# Patient Record
Sex: MALE | Race: WHITE
[De-identification: names, ages, dates, MRNs, and addresses within clinical notes are randomized per-mention and may not be internally consistent; named-entity substitution may affect disease eponyms.]

---

## 2019-11-18 ENCOUNTER — HOSPITAL ENCOUNTER (OUTPATIENT)
Dept: HOSPITAL 26 - MUS | Age: 59
Discharge: HOME | End: 2019-11-18
Payer: COMMERCIAL

## 2019-11-18 DIAGNOSIS — E04.2: Primary | ICD-10-CM

## 2019-12-30 ENCOUNTER — HOSPITAL ENCOUNTER (INPATIENT)
Dept: HOSPITAL 26 - MED | Age: 59
LOS: 7 days | Discharge: HOME HEALTH | DRG: 673 | End: 2020-01-06
Attending: GENERAL PRACTICE | Admitting: GENERAL PRACTICE
Payer: COMMERCIAL

## 2019-12-30 VITALS — DIASTOLIC BLOOD PRESSURE: 91 MMHG | SYSTOLIC BLOOD PRESSURE: 142 MMHG

## 2019-12-30 VITALS — BODY MASS INDEX: 24.96 KG/M2 | WEIGHT: 159 LBS | HEIGHT: 67 IN

## 2019-12-30 DIAGNOSIS — D62: ICD-10-CM

## 2019-12-30 DIAGNOSIS — E43: ICD-10-CM

## 2019-12-30 DIAGNOSIS — A08.4: ICD-10-CM
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
## 2019-12-30 DIAGNOSIS — R60.1: ICD-10-CM

## 2019-12-30 DIAGNOSIS — I21.A1: ICD-10-CM

## 2019-12-30 DIAGNOSIS — E87.6: ICD-10-CM

## 2019-12-30 DIAGNOSIS — D63.1: ICD-10-CM

## 2019-12-30 DIAGNOSIS — E83.41: ICD-10-CM
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
## 2019-12-30 DIAGNOSIS — E07.9: ICD-10-CM

## 2019-12-30 DIAGNOSIS — J96.90: ICD-10-CM

## 2019-12-30 DIAGNOSIS — R33.9: ICD-10-CM

## 2019-12-30 DIAGNOSIS — S10.93XA: ICD-10-CM

## 2019-12-30 DIAGNOSIS — E83.39: ICD-10-CM

## 2019-12-30 DIAGNOSIS — I34.0: ICD-10-CM

## 2019-12-30 DIAGNOSIS — I50.43: ICD-10-CM

## 2019-12-30 DIAGNOSIS — Z94.0: ICD-10-CM

## 2019-12-30 DIAGNOSIS — N18.4: ICD-10-CM
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
## 2019-12-30 DIAGNOSIS — N17.0: Primary | ICD-10-CM

## 2019-12-30 DIAGNOSIS — R13.10: ICD-10-CM

## 2019-12-30 DIAGNOSIS — E87.1: ICD-10-CM

## 2019-12-30 DIAGNOSIS — G93.41: ICD-10-CM

## 2019-12-30 DIAGNOSIS — I13.0: ICD-10-CM
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
## 2019-12-30 LAB
ALBUMIN FLD-MCNC: 2.6 G/DL (ref 3.4–5)
ANION GAP SERPL CALCULATED.3IONS-SCNC: 20.5 MMOL/L (ref 8–16)
AST SERPL-CCNC: 75 U/L (ref 15–37)
BASOPHILS # BLD AUTO: 0 K/UL (ref 0–0.22)
BASOPHILS NFR BLD AUTO: 0.1 % (ref 0–2)
BILIRUB SERPL-MCNC: 0.6 MG/DL (ref 0–1)
CHLORIDE SERPL-SCNC: 74 MMOL/L (ref 98–107)
CO2 SERPL-SCNC: 14.3 MMOL/L (ref 21–32)
EOSINOPHIL # BLD AUTO: 0 K/UL (ref 0–0.4)
EOSINOPHIL NFR BLD AUTO: 0 % (ref 0–4)
ERYTHROCYTE [DISTWIDTH] IN BLOOD BY AUTOMATED COUNT: 19.4 % (ref 11.6–13.7)
GFR SERPL CREATININE-BSD FRML MDRD: 14 ML/MIN (ref 90–?)
GLUCOSE SERPL-MCNC: 139 MG/DL (ref 74–106)
HCT VFR BLD AUTO: 21 % (ref 36–52)
HGB BLD-MCNC: 7.2 G/DL (ref 12–18)
LYMPHOCYTES # BLD AUTO: 0.3 K/UL (ref 2–11.5)
LYMPHOCYTES NFR BLD AUTO: 7.2 % (ref 20.5–51.1)
MCH RBC QN AUTO: 36 PG (ref 27–31)
MCHC RBC AUTO-ENTMCNC: 34 G/DL (ref 33–37)
MCV RBC AUTO: 106.7 FL (ref 80–94)
MONOCYTES # BLD AUTO: 0.3 K/UL (ref 0.8–1)
MONOCYTES NFR BLD AUTO: 5.7 % (ref 1.7–9.3)
NEUTROPHILS # BLD AUTO: 4.1 K/UL (ref 1.8–7.7)
NEUTROPHILS NFR BLD AUTO: 87 % (ref 42.2–75.2)
PLATELET # BLD AUTO: 339 K/UL (ref 140–450)
POTASSIUM SERPL-SCNC: 3.8 MMOL/L (ref 3.5–5.1)
PROTHROMBIN TIME: 10.4 SECS (ref 10.8–13.4)
RBC # BLD AUTO: 1.97 MIL/UL (ref 4.2–6.1)
WBC # BLD AUTO: 4.7 K/UL (ref 4.8–10.8)

## 2019-12-30 PROCEDURE — P9016 RBC LEUKOCYTES REDUCED: HCPCS

## 2019-12-30 NOTE — NUR
60 Y/O MALE BIB SON. PRESENTS TO ED, C/O GENERALIZED WEAKNESS X3 DAYS. PT 
STATES HAVING MILD DIFFICULTY BREATHING, DENIES ANY CHEST PAIN. PT SPO2 AT 97% 
ROOM AIR. NO SOB NOTED. NO SLURRED SPEECH, FACIAL DROOP, ARM DRIFT. PT AAOX4. 
PT DENIES ANY HEADACHE/DIZZINESS. PT STABLE CONDITION. BED LOWEST POSITION, 
SIDE RAILS UP. WILL CONTINUE TO MONITOR.

## 2019-12-31 VITALS — SYSTOLIC BLOOD PRESSURE: 130 MMHG | DIASTOLIC BLOOD PRESSURE: 87 MMHG

## 2019-12-31 VITALS — SYSTOLIC BLOOD PRESSURE: 143 MMHG | DIASTOLIC BLOOD PRESSURE: 84 MMHG

## 2019-12-31 VITALS — DIASTOLIC BLOOD PRESSURE: 76 MMHG | SYSTOLIC BLOOD PRESSURE: 128 MMHG

## 2019-12-31 VITALS — DIASTOLIC BLOOD PRESSURE: 65 MMHG | SYSTOLIC BLOOD PRESSURE: 119 MMHG

## 2019-12-31 VITALS — SYSTOLIC BLOOD PRESSURE: 138 MMHG | DIASTOLIC BLOOD PRESSURE: 76 MMHG

## 2019-12-31 VITALS — DIASTOLIC BLOOD PRESSURE: 94 MMHG | SYSTOLIC BLOOD PRESSURE: 145 MMHG

## 2019-12-31 VITALS — SYSTOLIC BLOOD PRESSURE: 131 MMHG | DIASTOLIC BLOOD PRESSURE: 85 MMHG

## 2019-12-31 VITALS — SYSTOLIC BLOOD PRESSURE: 135 MMHG | DIASTOLIC BLOOD PRESSURE: 94 MMHG

## 2019-12-31 VITALS — DIASTOLIC BLOOD PRESSURE: 86 MMHG | SYSTOLIC BLOOD PRESSURE: 135 MMHG

## 2019-12-31 VITALS — SYSTOLIC BLOOD PRESSURE: 156 MMHG | DIASTOLIC BLOOD PRESSURE: 94 MMHG

## 2019-12-31 VITALS — DIASTOLIC BLOOD PRESSURE: 75 MMHG | SYSTOLIC BLOOD PRESSURE: 139 MMHG

## 2019-12-31 LAB
AMORPH SED URNS QL MICRO: (no result) /HPF
ANION GAP SERPL CALCULATED.3IONS-SCNC: 16.8 MMOL/L (ref 8–16)
ANION GAP SERPL CALCULATED.3IONS-SCNC: 20 MMOL/L (ref 8–16)
ANION GAP SERPL CALCULATED.3IONS-SCNC: 20.3 MMOL/L (ref 8–16)
ANION GAP SERPL CALCULATED.3IONS-SCNC: 23.3 MMOL/L (ref 8–16)
ANION GAP SERPL CALCULATED.3IONS-SCNC: 24.6 MMOL/L (ref 8–16)
APPEARANCE UR: CLEAR
BARBITURATES UR QL SCN: (no result) NG/ML
BENZODIAZ UR QL SCN: (no result) NG/ML
BILIRUB UR QL STRIP: NEGATIVE
BUN SERPL-MCNC: 49 MG/DL (ref 7–18)
BUN SERPL-MCNC: 76 MG/DL (ref 7–18)
BUN SERPL-MCNC: 76 MG/DL (ref 7–18)
BUN SERPL-MCNC: 77 MG/DL (ref 7–18)
BUN SERPL-MCNC: 78 MG/DL (ref 7–18)
BUN SERPL-MCNC: 81 MG/DL (ref 7–18)
BZE UR QL SCN: (no result) NG/ML
CANNABINOIDS UR QL SCN: (no result) NG/ML
CHLORIDE SERPL-SCNC: 77 MMOL/L (ref 98–107)
CHLORIDE SERPL-SCNC: 77 MMOL/L (ref 98–107)
CHLORIDE SERPL-SCNC: 78 MMOL/L (ref 98–107)
CHLORIDE SERPL-SCNC: 80 MMOL/L (ref 98–107)
CHLORIDE SERPL-SCNC: 88 MMOL/L (ref 98–107)
CHOLEST/HDLC SERPL: 5.5 {RATIO} (ref 1–4.5)
CK MB SERPL-MCNC: 12.2 NG/ML (ref 0–3.6)
CK MB SERPL-RTO: 1.3 % (ref 0–2.5)
CO2 SERPL-SCNC: 12.1 MMOL/L (ref 21–32)
CO2 SERPL-SCNC: 12.3 MMOL/L (ref 21–32)
CO2 SERPL-SCNC: 13.6 MMOL/L (ref 21–32)
CO2 SERPL-SCNC: 16.6 MMOL/L (ref 21–32)
CO2 SERPL-SCNC: 23.3 MMOL/L (ref 21–32)
COLOR UR: YELLOW
CREAT SERPL-MCNC: 3.6 MG/DL (ref 0.7–1.3)
CREAT SERPL-MCNC: 5.1 MG/DL (ref 0.7–1.3)
CREAT SERPL-MCNC: 5.2 MG/DL (ref 0.7–1.3)
CREAT SERPL-MCNC: 5.2 MG/DL (ref 0.7–1.3)
CREAT SERPL-MCNC: 5.3 MG/DL (ref 0.7–1.3)
CREAT SERPL-MCNC: 5.3 MG/DL (ref 0.7–1.3)
EOSINOPHIL NFR BLD MANUAL: 1 % (ref 0–4)
ERYTHROCYTE [DISTWIDTH] IN BLOOD BY AUTOMATED COUNT: 18.7 % (ref 11.6–13.7)
GFR SERPL CREATININE-BSD FRML MDRD: 14 ML/MIN (ref 90–?)
GFR SERPL CREATININE-BSD FRML MDRD: 15 ML/MIN (ref 90–?)
GFR SERPL CREATININE-BSD FRML MDRD: 22 ML/MIN (ref 90–?)
GLUCOSE SERPL-MCNC: 110 MG/DL (ref 74–106)
GLUCOSE SERPL-MCNC: 119 MG/DL (ref 74–106)
GLUCOSE SERPL-MCNC: 129 MG/DL (ref 74–106)
GLUCOSE SERPL-MCNC: 88 MG/DL (ref 74–106)
GLUCOSE SERPL-MCNC: 89 MG/DL (ref 74–106)
GLUCOSE UR STRIP-MCNC: (no result) MG/DL
HCT VFR BLD AUTO: 19.3 % (ref 36–52)
HDLC SERPL-MCNC: 25 MG/DL (ref 40–60)
HGB BLD-MCNC: 6.7 G/DL (ref 12–18)
HGB UR QL STRIP: (no result)
HYALINE CASTS URNS QL MICRO: (no result) /LPF
IRON SERPL-MCNC: 83 UG/DL (ref 50–175)
LDLC SERPL CALC-MCNC: 100 MG/DL (ref 60–100)
LEUKOCYTE ESTERASE UR QL STRIP: NEGATIVE
LIPASE SERPL-CCNC: 122 U/L (ref 73–393)
LYMPHOCYTES NFR BLD MANUAL: 10 % (ref 20–46)
MAGNESIUM SERPL-MCNC: 2.7 MG/DL (ref 1.8–2.4)
MAGNESIUM SERPL-MCNC: 2.8 MG/DL (ref 1.8–2.4)
MCH RBC QN AUTO: 37 PG (ref 27–31)
MCHC RBC AUTO-ENTMCNC: 35 G/DL (ref 33–37)
MCV RBC AUTO: 105.9 FL (ref 80–94)
MONOCYTES NFR BLD MANUAL: 6 % (ref 5–12)
NITRITE UR QL STRIP: NEGATIVE
NRBC BLD MANUAL-RTO: 7 /100WBC (ref 0–0)
OPIATES UR QL SCN: (no result) NG/ML
PCP UR QL SCN: (no result) NG/ML
PH UR STRIP: 6 [PH] (ref 5–9)
PHOSPHATE SERPL-MCNC: 5.7 MG/DL (ref 2.5–4.9)
PHOSPHATE SERPL-MCNC: 5.7 MG/DL (ref 2.5–4.9)
PLATELET # BLD AUTO: 338 K/UL (ref 140–450)
POTASSIUM SERPL-SCNC: 3.1 MMOL/L (ref 3.5–5.1)
POTASSIUM SERPL-SCNC: 3.6 MMOL/L (ref 3.5–5.1)
POTASSIUM SERPL-SCNC: 3.6 MMOL/L (ref 3.5–5.1)
POTASSIUM SERPL-SCNC: 3.7 MMOL/L (ref 3.5–5.1)
POTASSIUM SERPL-SCNC: 3.9 MMOL/L (ref 3.5–5.1)
RBC # BLD AUTO: 1.82 MIL/UL (ref 4.2–6.1)
RBC #/AREA URNS HPF: (no result) /HPF (ref 0–5)
SODIUM SERPL-SCNC: 105 MMOL/L (ref 136–145)
SODIUM SERPL-SCNC: 108 MMOL/L (ref 136–145)
SODIUM SERPL-SCNC: 109 MMOL/L (ref 136–145)
SODIUM SERPL-SCNC: 110 MMOL/L (ref 136–145)
SODIUM SERPL-SCNC: 113 MMOL/L (ref 136–145)
SODIUM SERPL-SCNC: 125 MMOL/L (ref 136–145)
TIBC SERPL-MCNC: 202 UG/DL (ref 250–450)
TRIGL SERPL-MCNC: 66 MG/DL (ref 30–150)
TSH SERPL DL<=0.05 MIU/L-ACNC: 2.27 UIU/ML (ref 0.34–3.74)
WBC # BLD AUTO: 5.4 K/UL (ref 4.8–10.8)
WBC NRBC COR # BLD AUTO: 5 K/UL (ref 4.5–11)
WBC,URINE: (no result) /HPF (ref 0–5)

## 2019-12-31 PROCEDURE — 5A1D70Z PERFORMANCE OF URINARY FILTRATION, INTERMITTENT, LESS THAN 6 HOURS PER DAY: ICD-10-PCS

## 2019-12-31 PROCEDURE — 02HV33Z INSERTION OF INFUSION DEVICE INTO SUPERIOR VENA CAVA, PERCUTANEOUS APPROACH: ICD-10-PCS | Performed by: GENERAL PRACTICE

## 2019-12-31 PROCEDURE — 30233N1 TRANSFUSION OF NONAUTOLOGOUS RED BLOOD CELLS INTO PERIPHERAL VEIN, PERCUTANEOUS APPROACH: ICD-10-PCS

## 2019-12-31 RX ADMIN — ATORVASTATIN CALCIUM SCH MG: 20 TABLET, FILM COATED ORAL at 21:00

## 2019-12-31 RX ADMIN — ACETAMINOPHEN SCH MG: 325 TABLET ORAL at 16:00

## 2019-12-31 RX ADMIN — OXYCODONE HYDROCHLORIDE AND ACETAMINOPHEN SCH MG: 500 TABLET ORAL at 09:19

## 2019-12-31 RX ADMIN — FUROSEMIDE SCH MG: 10 INJECTION, SOLUTION INTRAMUSCULAR; INTRAVENOUS at 09:48

## 2019-12-31 RX ADMIN — FUROSEMIDE SCH MG: 20 TABLET ORAL at 13:35

## 2019-12-31 RX ADMIN — ACETAMINOPHEN SCH MG: 325 TABLET ORAL at 20:00

## 2019-12-31 RX ADMIN — Medication SCH MG: at 09:19

## 2019-12-31 RX ADMIN — CYCLOSPORINE SCH MG: 25 CAPSULE, LIQUID FILLED ORAL at 09:22

## 2019-12-31 RX ADMIN — ACETAMINOPHEN SCH MG: 325 TABLET ORAL at 13:35

## 2019-12-31 RX ADMIN — FUROSEMIDE SCH MG: 20 TABLET ORAL at 16:52

## 2019-12-31 RX ADMIN — CALCIUM ACETATE SCH MG: 667 CAPSULE ORAL at 16:52

## 2019-12-31 RX ADMIN — MORPHINE SULFATE PRN MG: 2 INJECTION, SOLUTION INTRAMUSCULAR; INTRAVENOUS at 10:08

## 2019-12-31 RX ADMIN — CALCIUM ACETATE SCH MG: 667 CAPSULE ORAL at 13:34

## 2019-12-31 NOTE — NUR
INFORMED PT THAT WILL COME BACK FOR MEDICATION. PT UNCOOPERATIVE RIGHT NOW AND WANTING TO 
GET OUT OF BED. PT STATES HE WANTS TO GO OUT AND HE HAS FRIENDS WAITING FOR HIM. PT DID NOT 
WANT TO TAKE HIS MEDICATION. WILL NOT TAKE MEDICATION OUT OF OMNICELL.

## 2019-12-31 NOTE — NUR
PT ADMITTED TO ICU-2. TRANSFERRED VIA GURNEY. REPORT GIVEN TO YAMILE REID. PT 
STABLE CONDITION. TRANSFER OF CARE AT THIS TIME.

## 2019-12-31 NOTE — NUR
* ST PVE NOTE * 



Clinician attempting to complete Bedside dysphagia and oral mechanism exams, but MDs at 
bedside and time out called for central line insertion. Clinician attempting to return to pt 
2 more times, but central line insertion still incomplete at this time. 



Nsg informed clinician will return on Thursday, 1/2/20 to re-attempt evaluation if pt's 
condition allows, w/nsg verbalizing understanding and agreement w/clinician's report. 



PVE 

11:15 - 11:30

## 2019-12-31 NOTE — NUR
UNABLE TO INSERT ABRAHAM CATHETER. RESISTANCE FELT, NO BLEEDING NOTED. PT ABLE TO VOID FREELY 
USING URINAL. RESIDENT PHYSICIAN DR. SILVA MADE AWARE.

## 2019-12-31 NOTE — NUR
RECEIVED REPORT FROM CHARGE NURSE WHO RECEIVED PATIENT FROM ER. SON AND DAUGHTER IN LAW AT 
BEDSIDE. PATIENT SPEAKS MAINLY Citizen of [place] BUT ABLE TO COMMUNICATE AND UNDERSTAND STAFF WELL. 
PATIENT HAS BEANIE ON HEAD. JACKET AROUND FRONT OF BODY. AFEBRILE. ROOM AIR. NO SOB NOTED. 
PERRLA BILATERALLY. CLEAR LUNG SOUNDS. RESPIRATIONS UNLABORED AND SYMMETRICAL. BOWEL SOUNDS 
ACTIVE IN ALL 4 QUAD. DENIES ANY PAIN. STATES WAS ON LOW SODIUM DIET AT HOME "WHICH LED TO 
WHY WE ARE HERE NOW". SKIN INTACT. DENIES ANY OPEN WOUNDS. REFUSED VACCINES FLU OR PNA AT 
THIS TIME. AWARE OF WHAT THEY ARE AND REFUSED. AAOX4. ABLE TO MAKE NEEDS KNOWN. ABLE TO OBEY 
COMMANDS. MRSA SWABS COLLECTED BY CHARGE NURSE AND SENT TO LAB. VSS. STATES HX OF HTN. AV 
SHUNT STATED BY FAMILY AND NOTED ON RIGHT UPPER ARM. STATES RENAL TRANSPLANT OVER 20 YEARS 
AGO. HAVING HICCUPS AT THIS TIME INTERMITTENTLY. 18 G LEFT FA NOTED. INTACT AND PATENT. BED 
IN LOWEST POSITION. CALL LIGHT WITHIN REACH. ON CARDIAC MONITOR. PATIENT FALLING ASLEEP 
AFTER ADMISSION AT THIS TIME. SON STATES " HE WANTS TO TRY AND SLEEP" WILL CONTINUE TO 
MONITOR AT THIS TIME. NO S/S OF DISTRESS. NO SOB NOTED AT THIS TIME. NPO AT THIS TIME.

## 2019-12-31 NOTE — NUR
Note:



Per patient, he would like me to contact his son Benigno for assessment. I called Benigno 
multiple times, no answer, left messages.

## 2019-12-31 NOTE — NUR
PT STILL BEING UNCOOPERATIVE AND WANTING TO GET OUT OF BED. PT AGITATED STILL. MEDICATION 
ADMINISTERED. KEPT HOB 30 DEGREES. WILL CONTINUE TO MONITOR PT.

## 2019-12-31 NOTE — NUR
PATIENT USED URINAL. WITH NURSE ASSISTANCE. 300 CC OF CLEAR YELLOW URINE. PATIENT ABLE TO 
STAND AT SIDE OF BED WITH ASSISTANCE FOR SAFETY PURPOSES. NO SOB NOTED. NO S/S OF DISTRESS 
NOTED. ON ROOM AIR. VSS.

## 2019-12-31 NOTE — NUR
RESPIRATIONS EVEN AND UNLABORED. PUT PT ON OXYGEN DUE TO LOW OXYGEN SATURATION, WHEN CHECKED 
THE WAVEFORM WAS NOT CORRECT. CHECKED THE OXYGEN SATURATION WITH THE CORRECT WAVEFORM, 
READING WAS 96%. 

-------------------------------------------------------------------------------

Addendum: 01/01/20 at 0415 by Cheyenne Donahue RN

-------------------------------------------------------------------------------

WRONG DATE. DISREGARD. SUPPOSED TO BE ENTRY FOR 01/01/20

## 2019-12-31 NOTE — NUR
CENTRAL LINE INSERTION AT BEDSIDE. TIME OUT CALLED. NO SIGNS OF DISTRESS NOTED AT THIS TIME. 
SAFETY PRECAUTIONS IN PLACE.

## 2019-12-31 NOTE — NUR
PATIENT HAS BEEN SCREENED AND CATEGORIZED AS HIGH NUTRITION RISK. PATIENT WILL BE SEEN 
WITHIN 1-2 DAYS OF ADMISSION.



12/31/19-01/01/20



JODEE BENNETT RD

## 2019-12-31 NOTE — NUR
bedside use of urinal. patient able to stand. standby assistance for safety. 250cc yellow 
urine clear. no sob. no injury noted. will continue to monitor.

## 2019-12-31 NOTE — NUR
SPOKE WITH RADIOLOGY WHO STATES MD ABRAHAM JUST ORDERED CT SCAN FOR PATIENT. STATES SHE 
STILL HAS SOME PEOPLE ON THE FLOOR BUT WILL CALL WHEN DONE. MINDY CT NOT STAT. WILL FOLLOW 
UP.

## 2019-12-31 NOTE — NUR
PATIENT USED URINAL WITH ASSISTANCE OF SON. CLEAR YELLOW URINE 250 CC OUTPUT. NO INJURY 
NOTED. PATIENT TOLERATED WELL. WILL CONTINUE TO MONITOR.

## 2019-12-31 NOTE — NUR
MEDICATION ADMINISTERED TO PT TO HELP HIM SLEEP. TOLERATED WELL. ABLE TO SWALLOW MEDICATION 
WITH NO PROBLEM. FAMILY CURRENTLY AT BEDSIDE.

## 2019-12-31 NOTE — NUR
BLOOD TRANSFUSION STARTED. VSS. AFEBRILE. NO SIGNS OF ADVERSE REACTIONS NOTED. WILL CONTINUE 
TO MONITOR.

## 2019-12-31 NOTE — NUR
RECEIVED BEDSIDE REPORT FROM NIGHT SHIFT RN. PT IS AAOX4. ABLE TO MAKE NEEDS KNOWN. NORMAL 
SINUS RHYTHM ON MONITOR. PT IS ON ROOM AIR, LUNGS SOUND CLEAR BILATERALLY. ABDOMEN SOFT, 
NONTENDER WITH ACTIVE BOWEL SOUNDS. PERIPHERAL IV G 18 TO LEFT FOREARM PATENT AND INTACT, 
RUNNING NS AT 30 ML/HR. SKIN IS INTACT, DRY AND WARM TO TOUCH. PULSES PALPABLE TO ALL 
EXTREMITIES. CAP REFILL < 2 SEC. OLE AV SHUNT IN PLACE TO RIGHT ARM. ABLE TO MOVE ALL 
EXTREMITIES. BED IN LOWEST POSITION LOCKED. CALL LIGHT WITHIN REACH. WILL CONTINUE TO 
MONITOR.

## 2019-12-31 NOTE — NUR
RECEIVED REPORT FROM MORNING RN, KHADIJAH, FOR CONTINUITY OF CARE. VS STABLE AT THIS TIME. PT 
AROUSABLE TO NAME. DROWSY. AFEBRILE. ABLE TO MAKE NEEDS KNOWN AND FOLLOW COMMANDS. LUNG 
SOUNDS CLEAR. PT IN ROOM AIR. AUSCULTATED AND CLEAR LUNG SOUNDS. RESPIRATIONS EVEN AND 
UNLABORED. CHEST RISE SYMMETRIC. S1+S2 HEARD. SR ON MONITOR. ABDOMEN ROUND, SOFT AND 
NONDISTENDED. ON RENAL DIET. PT HAS BI CATHETER ON LEFT IJ, CURRENTLY RECEIVING 
DIALYSIS AT THIS TIME. PT ABLE TO USE URINAL. PT HAS PERIPHERAL IV ACCESS ON LEFT FOREARM 
18G. PT HAS 3% SODIUM CHLORIDE RUNNING AT 30ML/HR. ALL SAFETY PRECAUTIONS ARE IN PLACE. WILL 
CONTINUE TO MONITOR PT

## 2019-12-31 NOTE — NUR
NOTED PT TRYING TO GET OUT OF BED. PT'S FAMILY STILL AT BEDSIDE. OFFERED TO SIT PT TO A 
CHAIR. PT IS UNSTEADY AND DROWSY, UNABLE TO GET UP AND GET TO CHAIR WITH ASSISTANCE. PUT PT 
BACK TO BED AND COVERED HIM UP.

## 2019-12-31 NOTE — NUR
CHARGE NURSE STATES TO ENDORSE TO DAY SHIFT TO DO CT SCAN IN AM. CT SCAN IN USE AT THIS 
TIME. MD AT BEDSIDE STATES IT IS ROUTINE AND CAN ENDORSE FOR LATER. WILL CONTINUE TO MONITOR 
AT THIS TIME.

## 2019-12-31 NOTE — NUR
BLOOD TRANSFUSION DONE. VSS. NO SIGNS OF DISTRESS OR ADVERSE REACTION NOTED. NO NEED TO GIVE 
SECOND DOSE OF TYLENOL AND BENADRYL PER DR. RUSH.

## 2019-12-31 NOTE — NUR
12/31/19 RD INITIAL ASSESSMENT COMPLETED



PLEASE REFER TO NUTRITION ASSESSMENT UNDER CARE ACTIVITY FOR ESTIMATED NUTRITIONAL NEEDS. 



1. CONTINUE NPO AS MEDICALLY NECESSARY

2. RECOMMEND SWALLOW EVALUATION TO DETERMINE DIET TEXTURE AND LIQUID CONSISTENCY WITH RENAL 
DIETARY RESTRICTIONS

3. IF PATIENT IS UNABLE TO FOLLOW PO DIET, CONSIDER A FNS CONSULT FOR TUBE FEEDING

4. RD TO FOLLOW-UP 2-3 DAYS, HIGH RISK 



JODEE BENNETT, CARLOS

## 2019-12-31 NOTE — NUR
change of shift report given to day shift nurse. endorsed to day shift per hao crawford for day 
shift to take to ct. ct busy and busy with other patients. also endorsed the criticals to 
day shift madhav . stated called residents x3 and no answer. patient showing no sob or s/s 
of distress. vss. aaox4. no acitve bleeding noted. denies pain. steady denies any symptoms. 
endorsed to day shift.

## 2020-01-01 VITALS — DIASTOLIC BLOOD PRESSURE: 82 MMHG | SYSTOLIC BLOOD PRESSURE: 138 MMHG

## 2020-01-01 VITALS — SYSTOLIC BLOOD PRESSURE: 133 MMHG | DIASTOLIC BLOOD PRESSURE: 69 MMHG

## 2020-01-01 VITALS — DIASTOLIC BLOOD PRESSURE: 77 MMHG | SYSTOLIC BLOOD PRESSURE: 133 MMHG

## 2020-01-01 VITALS — DIASTOLIC BLOOD PRESSURE: 83 MMHG | SYSTOLIC BLOOD PRESSURE: 128 MMHG

## 2020-01-01 VITALS — DIASTOLIC BLOOD PRESSURE: 72 MMHG | SYSTOLIC BLOOD PRESSURE: 151 MMHG

## 2020-01-01 VITALS — SYSTOLIC BLOOD PRESSURE: 133 MMHG | DIASTOLIC BLOOD PRESSURE: 75 MMHG

## 2020-01-01 VITALS — SYSTOLIC BLOOD PRESSURE: 139 MMHG | DIASTOLIC BLOOD PRESSURE: 72 MMHG

## 2020-01-01 VITALS — DIASTOLIC BLOOD PRESSURE: 76 MMHG | SYSTOLIC BLOOD PRESSURE: 134 MMHG

## 2020-01-01 VITALS — DIASTOLIC BLOOD PRESSURE: 76 MMHG | SYSTOLIC BLOOD PRESSURE: 139 MMHG

## 2020-01-01 VITALS — DIASTOLIC BLOOD PRESSURE: 89 MMHG | SYSTOLIC BLOOD PRESSURE: 157 MMHG

## 2020-01-01 VITALS — DIASTOLIC BLOOD PRESSURE: 74 MMHG | SYSTOLIC BLOOD PRESSURE: 139 MMHG

## 2020-01-01 VITALS — DIASTOLIC BLOOD PRESSURE: 91 MMHG | SYSTOLIC BLOOD PRESSURE: 142 MMHG

## 2020-01-01 LAB
ANION GAP SERPL CALCULATED.3IONS-SCNC: 14.1 MMOL/L (ref 8–16)
ANION GAP SERPL CALCULATED.3IONS-SCNC: 14.6 MMOL/L (ref 8–16)
ANION GAP SERPL CALCULATED.3IONS-SCNC: 16.7 MMOL/L (ref 8–16)
ANION GAP SERPL CALCULATED.3IONS-SCNC: 18.3 MMOL/L (ref 8–16)
BASOPHILS # BLD AUTO: 0 K/UL (ref 0–0.22)
BASOPHILS NFR BLD AUTO: 0.1 % (ref 0–2)
BUN SERPL-MCNC: 48 MG/DL (ref 7–18)
BUN SERPL-MCNC: 49 MG/DL (ref 7–18)
BUN SERPL-MCNC: 50 MG/DL (ref 7–18)
BUN SERPL-MCNC: 53 MG/DL (ref 7–18)
CHLORIDE SERPL-SCNC: 89 MMOL/L (ref 98–107)
CHLORIDE SERPL-SCNC: 91 MMOL/L (ref 98–107)
CHLORIDE SERPL-SCNC: 92 MMOL/L (ref 98–107)
CHLORIDE SERPL-SCNC: 94 MMOL/L (ref 98–107)
CO2 SERPL-SCNC: 22.5 MMOL/L (ref 21–32)
CO2 SERPL-SCNC: 24.3 MMOL/L (ref 21–32)
CO2 SERPL-SCNC: 25.3 MMOL/L (ref 21–32)
CO2 SERPL-SCNC: 25.3 MMOL/L (ref 21–32)
CREAT SERPL-MCNC: 3.7 MG/DL (ref 0.7–1.3)
CREAT SERPL-MCNC: 3.8 MG/DL (ref 0.7–1.3)
CREAT SERPL-MCNC: 3.9 MG/DL (ref 0.7–1.3)
CREAT SERPL-MCNC: 4.1 MG/DL (ref 0.7–1.3)
EOSINOPHIL # BLD AUTO: 0 K/UL (ref 0–0.4)
EOSINOPHIL NFR BLD AUTO: 0 % (ref 0–4)
ERYTHROCYTE [DISTWIDTH] IN BLOOD BY AUTOMATED COUNT: 21.6 % (ref 11.6–13.7)
FERRITIN SERPL-MCNC: 527 NG/ML (ref 30–400)
FOLATE SERPL-MCNC: > 20 NG/ML (ref 3–?)
GFR SERPL CREATININE-BSD FRML MDRD: 19 ML/MIN (ref 90–?)
GFR SERPL CREATININE-BSD FRML MDRD: 20 ML/MIN (ref 90–?)
GFR SERPL CREATININE-BSD FRML MDRD: 21 ML/MIN (ref 90–?)
GFR SERPL CREATININE-BSD FRML MDRD: 22 ML/MIN (ref 90–?)
GLUCOSE SERPL-MCNC: 81 MG/DL (ref 74–106)
GLUCOSE SERPL-MCNC: 84 MG/DL (ref 74–106)
GLUCOSE SERPL-MCNC: 86 MG/DL (ref 74–106)
GLUCOSE SERPL-MCNC: 87 MG/DL (ref 74–106)
HCT VFR BLD AUTO: 19 % (ref 36–52)
HGB BLD-MCNC: 6.6 G/DL (ref 12–18)
LYMPHOCYTES # BLD AUTO: 0.2 K/UL (ref 2–11.5)
LYMPHOCYTES NFR BLD AUTO: 3.4 % (ref 20.5–51.1)
MAGNESIUM SERPL-MCNC: 2.2 MG/DL (ref 1.8–2.4)
MCH RBC QN AUTO: 36 PG (ref 27–31)
MCHC RBC AUTO-ENTMCNC: 35 G/DL (ref 33–37)
MCV RBC AUTO: 103.5 FL (ref 80–94)
MONOCYTES # BLD AUTO: 0.4 K/UL (ref 0.8–1)
MONOCYTES NFR BLD AUTO: 7.9 % (ref 1.7–9.3)
NEUTROPHILS # BLD AUTO: 4.8 K/UL (ref 1.8–7.7)
NEUTROPHILS NFR BLD AUTO: 88.6 % (ref 42.2–75.2)
PHOSPHATE SERPL-MCNC: 4.4 MG/DL (ref 2.5–4.9)
PLATELET # BLD AUTO: 272 K/UL (ref 140–450)
POTASSIUM SERPL-SCNC: 2.9 MMOL/L (ref 3.5–5.1)
POTASSIUM SERPL-SCNC: 3 MMOL/L (ref 3.5–5.1)
POTASSIUM SERPL-SCNC: 3.4 MMOL/L (ref 3.5–5.1)
POTASSIUM SERPL-SCNC: 3.8 MMOL/L (ref 3.5–5.1)
RBC # BLD AUTO: 1.83 MIL/UL (ref 4.2–6.1)
SODIUM SERPL-SCNC: 127 MMOL/L (ref 136–145)
SODIUM SERPL-SCNC: 128 MMOL/L (ref 136–145)
SODIUM SERPL-SCNC: 128 MMOL/L (ref 136–145)
SODIUM SERPL-SCNC: 131 MMOL/L (ref 136–145)
TRANSFERRIN SERPL-MCNC: 174 MG/DL (ref 200–370)
VIT B12 SERPL-MCNC: > 2000 PG/ML (ref 232–1245)
WBC # BLD AUTO: 5.4 K/UL (ref 4.8–10.8)

## 2020-01-01 RX ADMIN — ATORVASTATIN CALCIUM SCH MG: 20 TABLET, FILM COATED ORAL at 20:42

## 2020-01-01 RX ADMIN — Medication SCH MG: at 09:23

## 2020-01-01 RX ADMIN — SODIUM CHLORIDE SCH MLS/HR: 9 INJECTION, SOLUTION INTRAVENOUS at 06:05

## 2020-01-01 RX ADMIN — FUROSEMIDE SCH MG: 10 INJECTION, SOLUTION INTRAMUSCULAR; INTRAVENOUS at 09:23

## 2020-01-01 RX ADMIN — OXYCODONE HYDROCHLORIDE AND ACETAMINOPHEN SCH MG: 500 TABLET ORAL at 09:23

## 2020-01-01 RX ADMIN — CYCLOSPORINE SCH MG: 25 CAPSULE, LIQUID FILLED ORAL at 09:27

## 2020-01-01 NOTE — NUR
PT REPOSITIONED. MORNING CARE PROVIDED. EYES ARE CLOSED. SNORING. VS STABLE. OXYGEN 
SATURATION WNL.

## 2020-01-01 NOTE — NUR
PT STILL GETTING BLOOD TRANSFUSION. SON AT BEDSIDE, PT IS ASLEEP, ON ROOM AIR. NO SIGNS OF 
ACUTE DISTRESS NOTED. SAFETY PRECAUTIONS IN PLACE.

## 2020-01-01 NOTE — NUR
BLOOD TRANSFUSION STARTED. VSS. AFEBRILE. WILL MONITOR PT CLOSELY FOR POSSIBLE S/SX OF 
ADVERSE REACTIONS.

## 2020-01-01 NOTE — NUR
BLOOD TRANSFUSION COMPLETED. VSS. AFEBRILE. NO SIGNS OF ADVERSE REACTIONS NOTED. WILL 
CONTINUE TO MONITOR.

## 2020-01-01 NOTE — NUR
DR. ROGERS AT BEDSIDE TO SEE PT. CLARIFIED POTASSIUM ORDER, PER DOCTOR ROGERS PT WILL NEED ALL 
THE POTASSIUM ORDERED.

## 2020-01-01 NOTE — NUR
RESPIRATIONS EVEN AND UNLABORED. PUT PT ON OXYGEN DUE TO LOW OXYGEN SATURATION, WHEN CHECKED 
THE WAVEFORM WAS NOT CORRECT. CHECKED THE OXYGEN SATURATION WITH THE CORRECT WAVEFORM, 
READING WAS 96%.

## 2020-01-01 NOTE — NUR
DID BEDSIDE SWALLOW EVAL. PT SWALLOWS JELLO AND APPLE SAUCE WITHOUT COUGHING OR ANY 
DIFFICULTY. PT ABLE TO TAKE PILLS WITH APPLE SAUCE. CHARGE NURSE AWARE.

## 2020-01-01 NOTE — NUR
RECEIVED REPORT FROM AM NURSE. PT AT BED EYES CLOSED, PERRL 3MM, BRISK, COOPERATIVE, ALERT 
AND ORIENTED X4, BREATHING REGULARLY ON ROOM AIR, LUNG SOUNDS CLEAR THROUGHOUT, HEART RATE 
REGULAR S1S2 PRESENT, CAP REFILL <3S, PULSES 2+ BILATERAL UPPER AND LOWER EXTREMITIES, 
ABDOMEN SOFT, ROUND, NONDISTENDED, NONTENDER, BLADDER, SOFT, ROUND, NONDISTENDED, NONTENDER, 
ABRAHAM CATHETER IN PLACE, DRAINING CLEAR, YELLOW URINE, PT HAS GENERALIZED WEAKNESS, PT SKIN 
WARM, DRY, INTACT, PT HAS LEFT FOREARM PERIPHERAL IV 18 GAUGE RUNNING NS AT 30 ML/HR. PT HAS 
A LEFT IJ BI CATH. HOB 30 DEGREES, SIDE RAILS UP X2, BED AT LOWEST POSITION.

## 2020-01-01 NOTE — NUR
NO CHANGE IN PT'S CONDITION AT THIS TIME. RESPIRATIONS EVEN AND UNLABORED. OXYGEN SATURATION 
WNL. PT IN BED WITH EYES CLOSED. SNORING.

## 2020-01-01 NOTE — NUR
SEROSANGUINEOUS DRAINAGE NOTED FROM LEFT EAR. DR. CRONIN MADE AWARE. PT'S LEFT EAR EXAMINED 
BY DR. CRONIN AND DR. ROGERS AT BEDSIDE. WILL FOLLOW UP WITH ANY NEW ORDERS.

## 2020-01-01 NOTE — NUR
RECEIVED BEDSIDE REPORT FROM NIGHT SHIFT RN. PT IS AAOX1. ABLE TO MAKE NEEDS KNOWN BUT 
RESTLESS AND DOES NOT FOLLOW COMMANDS. DENIES PAIN. AFEBRILE. NORMAL SINUS RHYTHM ON 
MONITOR. S1 S2 HEARD. PULSES PALPABLE TO ALL EXTREMITIES. CAP REFILL < 2 SEC. PT IS ON ROOM 
AIR, LUNGS SOUND CLEAR BILATERALLY. BREATHING EVEN AND UNLABORED. ABDOMEN SOFT, NONTENDER 
WITH ACTIVE BOWEL SOUNDS. BI CATH TO LEFT IJ IN PLACE, PERIPHERAL IV G 18 TO LEFT 
FOREARM PATENT AND INTACT, RUNNING NS AT 30 ML/HR. SKIN IS INTACT, DRY AND WARM TO TOUCH. 
OLD AV SHUNT IN PLACE TO RIGHT ARM. ABLE TO MOVE ALL EXTREMITIES. HOB 30 DEGREES. BED IN 
LOWEST POSITION LOCKED. CALL LIGHT WITHIN REACH. WILL CONTINUE TO MONITOR.

## 2020-01-01 NOTE — NUR
DR. VILLARREAL IN THE UNIT. FOLLOWED UP REGARDING PT'S IV FLUIDS. ACCORDING TO HER, WAIT FOR 
MORNING LABS RESULT THEN SHE WILL ORDER IV FLUIDS.

## 2020-01-01 NOTE — NUR
NO CHANGE IN CONDITION AT THIS TIME. PT STILL DROWSY, DAUGHTER AT BEDSIDE, VSS. REPOSITIONED 
PT. SAFETY PRECAUTIONS IN PLACE.

## 2020-01-01 NOTE — NUR
VS STABLE. IV SITES INTACT AND ASYMPTOMATIC. HOB 30 DEGREES. ALL OTHER SAFETY PRECAUTIONS 
ARE IN PLACE.

## 2020-01-02 VITALS — DIASTOLIC BLOOD PRESSURE: 77 MMHG | SYSTOLIC BLOOD PRESSURE: 157 MMHG

## 2020-01-02 VITALS — SYSTOLIC BLOOD PRESSURE: 140 MMHG | DIASTOLIC BLOOD PRESSURE: 79 MMHG

## 2020-01-02 VITALS — SYSTOLIC BLOOD PRESSURE: 144 MMHG | DIASTOLIC BLOOD PRESSURE: 77 MMHG

## 2020-01-02 VITALS — SYSTOLIC BLOOD PRESSURE: 158 MMHG | DIASTOLIC BLOOD PRESSURE: 90 MMHG

## 2020-01-02 VITALS — DIASTOLIC BLOOD PRESSURE: 76 MMHG | SYSTOLIC BLOOD PRESSURE: 155 MMHG

## 2020-01-02 VITALS — SYSTOLIC BLOOD PRESSURE: 163 MMHG | DIASTOLIC BLOOD PRESSURE: 101 MMHG

## 2020-01-02 VITALS — SYSTOLIC BLOOD PRESSURE: 155 MMHG | DIASTOLIC BLOOD PRESSURE: 76 MMHG

## 2020-01-02 VITALS — DIASTOLIC BLOOD PRESSURE: 75 MMHG | SYSTOLIC BLOOD PRESSURE: 146 MMHG

## 2020-01-02 VITALS — DIASTOLIC BLOOD PRESSURE: 71 MMHG | SYSTOLIC BLOOD PRESSURE: 132 MMHG

## 2020-01-02 VITALS — SYSTOLIC BLOOD PRESSURE: 130 MMHG | DIASTOLIC BLOOD PRESSURE: 68 MMHG

## 2020-01-02 VITALS — DIASTOLIC BLOOD PRESSURE: 64 MMHG | SYSTOLIC BLOOD PRESSURE: 128 MMHG

## 2020-01-02 VITALS — DIASTOLIC BLOOD PRESSURE: 78 MMHG | SYSTOLIC BLOOD PRESSURE: 152 MMHG

## 2020-01-02 LAB
ANION GAP SERPL CALCULATED.3IONS-SCNC: 15.2 MMOL/L (ref 8–16)
BASOPHILS # BLD AUTO: 0 K/UL (ref 0–0.22)
BASOPHILS NFR BLD AUTO: 0.2 % (ref 0–2)
BUN SERPL-MCNC: 52 MG/DL (ref 7–18)
CHLORIDE SERPL-SCNC: 98 MMOL/L (ref 98–107)
CO2 SERPL-SCNC: 23.3 MMOL/L (ref 21–32)
CREAT SERPL-MCNC: 4.6 MG/DL (ref 0.7–1.3)
EOSINOPHIL # BLD AUTO: 0 K/UL (ref 0–0.4)
EOSINOPHIL NFR BLD AUTO: 0.9 % (ref 0–4)
ERYTHROCYTE [DISTWIDTH] IN BLOOD BY AUTOMATED COUNT: 21.8 % (ref 11.6–13.7)
GFR SERPL CREATININE-BSD FRML MDRD: 17 ML/MIN (ref 90–?)
GLUCOSE SERPL-MCNC: 75 MG/DL (ref 74–106)
HCT VFR BLD AUTO: 23.7 % (ref 36–52)
HGB BLD-MCNC: 8.1 G/DL (ref 12–18)
LYMPHOCYTES # BLD AUTO: 0.5 K/UL (ref 2–11.5)
LYMPHOCYTES NFR BLD AUTO: 9.1 % (ref 20.5–51.1)
MAGNESIUM SERPL-MCNC: 2.4 MG/DL (ref 1.8–2.4)
MCH RBC QN AUTO: 35 PG (ref 27–31)
MCHC RBC AUTO-ENTMCNC: 34 G/DL (ref 33–37)
MCV RBC AUTO: 101.6 FL (ref 80–94)
MONOCYTES # BLD AUTO: 0.4 K/UL (ref 0.8–1)
MONOCYTES NFR BLD AUTO: 8.5 % (ref 1.7–9.3)
NEUTROPHILS # BLD AUTO: 4.3 K/UL (ref 1.8–7.7)
NEUTROPHILS NFR BLD AUTO: 81.3 % (ref 42.2–75.2)
PHOSPHATE SERPL-MCNC: 4.3 MG/DL (ref 2.5–4.9)
PLATELET # BLD AUTO: 249 K/UL (ref 140–450)
POTASSIUM SERPL-SCNC: 3.5 MMOL/L (ref 3.5–5.1)
RBC # BLD AUTO: 2.34 MIL/UL (ref 4.2–6.1)
SODIUM SERPL-SCNC: 133 MMOL/L (ref 136–145)
WBC # BLD AUTO: 5.3 K/UL (ref 4.8–10.8)

## 2020-01-02 RX ADMIN — SODIUM CHLORIDE SCH MLS/HR: 9 INJECTION, SOLUTION INTRAVENOUS at 06:43

## 2020-01-02 RX ADMIN — FUROSEMIDE SCH MG: 10 INJECTION, SOLUTION INTRAMUSCULAR; INTRAVENOUS at 09:17

## 2020-01-02 RX ADMIN — OXYCODONE HYDROCHLORIDE AND ACETAMINOPHEN SCH MG: 500 TABLET ORAL at 09:17

## 2020-01-02 RX ADMIN — CARVEDILOL SCH MG: 3.12 TABLET, FILM COATED ORAL at 09:17

## 2020-01-02 RX ADMIN — CYCLOSPORINE SCH MG: 25 CAPSULE, LIQUID FILLED ORAL at 09:18

## 2020-01-02 RX ADMIN — ATORVASTATIN CALCIUM SCH MG: 20 TABLET, FILM COATED ORAL at 22:46

## 2020-01-02 RX ADMIN — CARVEDILOL SCH MG: 3.12 TABLET, FILM COATED ORAL at 22:45

## 2020-01-02 RX ADMIN — Medication SCH MG: at 09:16

## 2020-01-02 NOTE — NUR
PT'S DAUGHTER HERE AND AT BEDSIDE AND COMPLAINING THAT WE ARE NOT TAKING CARE OF PATIENT. 
PT'S DAUGHTER GOT MY NAME AND NOT COMFORTABLE FOR ME TO TAKE CARE OF PATIENT. WILL TRANSFER 
CARE TO OTHER NURSE. 

-------------------------------------------------------------------------------

Addendum: 01/03/20 at 0010 by Nevaeh Stroud RN

-------------------------------------------------------------------------------

DELETE NOTE

## 2020-01-02 NOTE — NUR
TRANSFERRED PATIENT TO ROOM 114 VIA WHEELCHAIR, ALL BELONGS GOES WITH PATIENT, PATIENT HAS 
HOME MEDS WHICH IS AT PHARMACY, FAMILY MEMBERS AWARE, WILL GIVE PATIENT WHEN DISCHARGE TO 
HOME. PATIENT'S VSS, DENIES PAIN, NO DISTRESS, REPORT GIVEN TO JEANETTE MAZA AT BEDSIDE.

## 2020-01-02 NOTE — NUR
RECEIVED BEDSIDE REPORT FROM NIGHT SHIFT NURSE, PT IS AAOX2, ABLE TO FOLLOW COMMANDS AND 
MAKE NEEDS KNOWN. CONFUSED ABOUT TIME AND WHAT HAPPEN TO HIS, EXPLAINED AND REORIENTATED 
PATIENT, NO S/S OF DISTRESS, CLEAR LUNG SOUNDS AARON. ON RA, O2 SAT 95%, DENIES ANY CHEST 
PAIN, SR ON CARDIAC MONITOR, CAP REFILL <2 SEC, SOFT ABDOMEN WITH ACTIVE BOWEL SOUNDS, NPO 
EXCEPT MEDS AT THIS TIME, ABRAHAM CATHETER IN PLACE WITH CLEAR YELLOW URINE VIA GRAVITY, ABLE 
TO MOVE ALL EXTREMITIES, GENERALIZED WEAKNESS NOTED, SKIN IS WARM AND DRY TO TOUCH, NO OPEN 
WOUNDS, BI CATH TO LIJ WITH PIGTAIL, RUNNING NS AT 30 ML/HR, IV TO LEFT AC, 18GA, 
PATENT AND SL. HOB ELEVATED TO 30 DEGREES, SAFETY MEASURES IN PLACE, WILL CONTINUE TO 
MONITOR.

## 2020-01-02 NOTE — NUR
PATIENT IS RESTING IN BED, NO S/S OF DISTRESS, STILL CONFUSED ABOUT WHAT IS GOING ON TO HIM, 
DR. GARCIA MADE AWARE AND SPOKE HIM AT BEDSIDE.

## 2020-01-02 NOTE — NUR
RECEIVED PT AND PT'S SISTER AT BEDSIDE. COMPLAINED RECEIVED FROM PT THAT NOTHING WAS DONE TO 
THEM SINCE THIS MORNING, PT UNCOOPERATIVE BUT ABLE TO TALK TO PT AWAKE, ALERT ORIENTED X 3. 
AND THAT PT IS STILL VOMITING. W/ IV ON LEFT AC G 22, LR AT 80CC/HR, INFUSING WELL. PT 
RECEIVED W/ PT

## 2020-01-02 NOTE — NUR
PATIENT IS RESTING IN BED, FAMILY MEMBERS AT BEDSIDE, HELP WITH LUNCH AT THIS, BUT PATIENT 
DOESN'T LIKE PUREE DIET, DR. GARCIA MADE AWARE, WAITING FOR ST EVAL.,

## 2020-01-02 NOTE — NUR
RECEIVED REPORT FROM AM NURSE. PT, COOPERATIVE, ALERT AND ORIENTED X4, SITTING ON BED. WITH 
ABRAHAM CATHETER IN PLACE, DRAINING CLEAR, YELLOW URINE, PT HAS GENERALIZED WEAKNESS, PT SKIN 
WARM, DRY, INTACT, PT HAS LEFT FOREARM PERIPHERAL IV 18 GAUGE RUNNING NS AT 30 ML/HR, 
RECEIVED W/ IV NOT WORKING; IV DISLODGED. PT HAS A LEFT IJ BI CATH FOR HEMODIALYSIS. 
DIALYSIS DONE YESTERDAY 01/01/20  AT THE ICU. HOB 30 DEGREES, SIDE RAILS UP X2, BED AT 
LOWEST POSITION.

## 2020-01-02 NOTE — NUR
SCHEDULED MEDICATION GIVEN BY MOUTH, PATIENT IS ABLE TO SWALLOW PILLS ONE BY ONE AT THIS 
TIME AND TOLERATED WELL.

## 2020-01-02 NOTE — NUR
DISCHARGE PLANNIN YO MALE PATIENT FROM HOME, WHO CAME IN DUE TO SOB AND GENERALIZED WEAKNESS X 3 DAYS.  
PAST MEDICAL AND SURGICAL HISTORY INCLUDE S/P LEFT KIDNEY TRANSPLANT 20 YEARS AGO.  INITIAL 
DIAGNOSIS OF SEVERE HYPONATREMIA AND CHF.  CURRENT LABS INCLUDE WBC 5.3, H/H 8.1/23.7, NA/K 
133/3.5, BUN/CREA 52/4.6.  ON IV LASIX.  CHEST X RAY ON ADMISSION SHOWED CHF.  RENAL U/S 
SHOWED ADULT ONSET POLYCYSTIC KIDNEY DISEASE AND LEFT PELVIC RENAL TRANSPLANT WITH MILD 
HYDRONEPHROSIS.  CT ABD/PELVIS SHOWED SMALL BILATERAL EFFUSION, PULMONARY EDEMA BILATERAL 
LOWER LUNGS VS MULTIFOCAL PNA.  MILD CARDIO MEGALY AND CORONARY ARTERY CALCIFICATIONS.  NECK 
CT SHOWED ILL DEFINES 4.6CM HYDOPENSE SRUCTURE WITHIN THE REGION OF THE RIGHT THYROID GLAND. 
 HEAD CT NORMAL.  CHEST U/S - MILD TO MODERATE RIGHT PLEURAL EFFUSION.  CARDIO CONSULT WITH 
DR. ULRICH FOR CHF.  NEPHRO CONSULT WITH DR. RAY FOR HYPONATREMIA, S/P KIDNEY TRANSPLANT.  DC 
PLANNING PENDING ON PATIENT'S RESPONSE TO TREATMENT.

-------------------------------------------------------------------------------

Addendum: 20 at 1222 by Nidia Pelaez CM

-------------------------------------------------------------------------------

DC PLANNING

PER PT RECOMMENDATION, HOME HEALTH FOR PT. WAITING FOR NEPHROLOGIST DR SIMPSON RECOMMENDATION 
FOR OUT PT DIALYSIS . FAXED TO [facility] FOR PHYSICAL THERAPY. CM TO FOLLOW. 

-------------------------------------------------------------------------------

Addendum: 20 at 1225 by Nidia Pelaez CM

-------------------------------------------------------------------------------

DC PLANNING:

DR MICHAELS INSERTED TUNNEL CATH FOR DIALYSIS ACCESS. HEMODIALYSIS SCHEDULED TODAY. DC PLAN TO GO 
HOME AFTER DIALYSIS. OUT PATIENT DIALYSIS IS ARRANGED BY DR DO'S OFFICE. POSSIBLE DC TODAY 

-------------------------------------------------------------------------------

Addendum: 20 at 1432 by Nidia Pelaez CM

-------------------------------------------------------------------------------

DC PLANNING

DISCHARGE HAS BEEN HELD TO MONITOR  RT IJ HEMATOMA  S/P CATH INSERTION. CM TO FOLLOW. IF 
STABLE WILL DC TOMORROW.

## 2020-01-03 VITALS — DIASTOLIC BLOOD PRESSURE: 95 MMHG | SYSTOLIC BLOOD PRESSURE: 155 MMHG

## 2020-01-03 VITALS — SYSTOLIC BLOOD PRESSURE: 109 MMHG | DIASTOLIC BLOOD PRESSURE: 64 MMHG

## 2020-01-03 VITALS — DIASTOLIC BLOOD PRESSURE: 81 MMHG | SYSTOLIC BLOOD PRESSURE: 133 MMHG

## 2020-01-03 VITALS — DIASTOLIC BLOOD PRESSURE: 79 MMHG | SYSTOLIC BLOOD PRESSURE: 149 MMHG

## 2020-01-03 VITALS — SYSTOLIC BLOOD PRESSURE: 123 MMHG | DIASTOLIC BLOOD PRESSURE: 71 MMHG

## 2020-01-03 LAB
ANION GAP SERPL CALCULATED.3IONS-SCNC: 15.7 MMOL/L (ref 8–16)
BASOPHILS # BLD AUTO: 0 K/UL (ref 0–0.22)
BASOPHILS NFR BLD AUTO: 0.2 % (ref 0–2)
BUN SERPL-MCNC: 57 MG/DL (ref 7–18)
CHLORIDE SERPL-SCNC: 101 MMOL/L (ref 98–107)
CO2 SERPL-SCNC: 23.9 MMOL/L (ref 21–32)
CREAT SERPL-MCNC: 5.3 MG/DL (ref 0.7–1.3)
EOSINOPHIL # BLD AUTO: 0.1 K/UL (ref 0–0.4)
EOSINOPHIL NFR BLD AUTO: 1.1 % (ref 0–4)
ERYTHROCYTE [DISTWIDTH] IN BLOOD BY AUTOMATED COUNT: 22.3 % (ref 11.6–13.7)
GFR SERPL CREATININE-BSD FRML MDRD: 14 ML/MIN (ref 90–?)
GLUCOSE SERPL-MCNC: 100 MG/DL (ref 74–106)
HCT VFR BLD AUTO: 24 % (ref 36–52)
HGB BLD-MCNC: 8.1 G/DL (ref 12–18)
LYMPHOCYTES # BLD AUTO: 0.7 K/UL (ref 2–11.5)
LYMPHOCYTES NFR BLD AUTO: 11.3 % (ref 20.5–51.1)
MAGNESIUM SERPL-MCNC: 2.4 MG/DL (ref 1.8–2.4)
MCH RBC QN AUTO: 35 PG (ref 27–31)
MCHC RBC AUTO-ENTMCNC: 34 G/DL (ref 33–37)
MCV RBC AUTO: 102.7 FL (ref 80–94)
MONOCYTES # BLD AUTO: 0.6 K/UL (ref 0.8–1)
MONOCYTES NFR BLD AUTO: 10 % (ref 1.7–9.3)
NEUTROPHILS # BLD AUTO: 5 K/UL (ref 1.8–7.7)
NEUTROPHILS NFR BLD AUTO: 77.4 % (ref 42.2–75.2)
PHOSPHATE SERPL-MCNC: 4.2 MG/DL (ref 2.5–4.9)
PLATELET # BLD AUTO: 220 K/UL (ref 140–450)
POTASSIUM SERPL-SCNC: 3.6 MMOL/L (ref 3.5–5.1)
RBC # BLD AUTO: 2.33 MIL/UL (ref 4.2–6.1)
SODIUM SERPL-SCNC: 137 MMOL/L (ref 136–145)
WBC # BLD AUTO: 6.4 K/UL (ref 4.8–10.8)

## 2020-01-03 RX ADMIN — SODIUM CHLORIDE SCH MLS/HR: 9 INJECTION, SOLUTION INTRAVENOUS at 07:45

## 2020-01-03 RX ADMIN — FUROSEMIDE SCH MG: 10 INJECTION, SOLUTION INTRAMUSCULAR; INTRAVENOUS at 08:18

## 2020-01-03 RX ADMIN — CARVEDILOL SCH MG: 3.12 TABLET, FILM COATED ORAL at 08:20

## 2020-01-03 RX ADMIN — ACETAMINOPHEN PRN MG: 325 TABLET ORAL at 08:19

## 2020-01-03 RX ADMIN — Medication SCH MG: at 08:19

## 2020-01-03 RX ADMIN — OXYCODONE HYDROCHLORIDE AND ACETAMINOPHEN SCH MG: 500 TABLET ORAL at 08:19

## 2020-01-03 RX ADMIN — CARVEDILOL SCH MG: 3.12 TABLET, FILM COATED ORAL at 21:02

## 2020-01-03 RX ADMIN — CYCLOSPORINE SCH MG: 25 CAPSULE, LIQUID FILLED ORAL at 08:26

## 2020-01-03 RX ADMIN — ATORVASTATIN CALCIUM SCH MG: 20 TABLET, FILM COATED ORAL at 21:02

## 2020-01-03 NOTE — NUR
PT IS SITTING IN CHAIR BY THE WINDOW. PT'S FAMILY BE BEDSIDE AND EATING LUNCH. NO COMPLAINS 
OF PAIN. CALL LIGHT WITHIN REACH.

## 2020-01-03 NOTE — NUR
1/3/20 RD FOLLOW UP COMPLETED



PLEASE REFER TO NUTRITION ASSESSMENT UNDER CARE ACTIVITY FOR ESTIMATED NUTRITIONAL NEEDS. 



1. CONTINUE MECHANICAL SOFT DIET

2. CONSIDER RENAL MECHANICAL SOFT DIET 

3. RD TO FOLLOW-UP 3-5 DAYS, MODERATE RISK



JODEE BENNETT, RD

## 2020-01-03 NOTE — NUR
KATHY Assessment/Discharge Plan



 Name: 

Benigno Baker 

Home Tel: 

[phone]

Relationship: 

son

Pre-Admission Living Arrangements: 

Lives with Other

Other: 

Benigno Baker 

Prior ADL 

 Independent

Current Home Health Name/Tel: 

N/A

Current DME/02 Name/Tel: 

N/A

Current Hospice Name/Tel: 

N/A

Current Dialysis Name/Tel: 

N/A

Healthcare Decision Maker: 

Patient

Tentative Discharge Plan Summary: 

Per patient's son Benigno his phone number is [phone], I requested 

Stephanie from Admitting Dept to please include this phone number on patient's 

face sheet. Patient lives at home with Benigno. He does not have a pcp at 

this time, only a kidney specialist. He does not have any difficulty 

filling his prescriptions at pharmacy. Benigno stated patient does not use 

illicit drugs or drinks alcohol. He also stated to his knowledge patient 

does not have hx of mental health. He does not have any questions/concerns 

at this time. REINIER and/or CM will follow up as needed.

 Signature: 

Elizabeth Reyes, MSW

Date: 

Dec 31, 2019

## 2020-01-03 NOTE — NUR
PT IS IN CHAIR WITH FAMILY BY BEDSIDE. NO  SIGNS OF DISTRESS NOTED. NO COMPLAINS OF PAIN. 
CALL LIGHT WITHIN REACH.

## 2020-01-03 NOTE — NUR
*S.T. BEDSIDE SWALLOW EVAL COMPLETED*

See report for details. Pt presents w/ adequate oropharyngeal swallow function for textures 
given. No overt s/s aspiration observed. Pt is able to self-feed. Pt c/o difficulty 
swallowing from PICC placement and requested that clinician recommend it be removed. 
Explained to pt that his concern can be expressed to doctors. 

Recommend:

1) Continue mechanical soft diet, thin liquids okay. Pt may advance to regular diet. 

2) P.O. meds okay whole, one at a time. 

No further tx indicated at this time. DC to nsg care. Endorsed to nsg.



Time [id number]

## 2020-01-03 NOTE — NUR
UNABLE TO COLLECT OCCULT STOOL SAMPLE. PT HAS NOT HAD A BOWEL MOVEMENT X 2 DAYS. REPORTED TO 
DR ROGERS.

## 2020-01-03 NOTE — NUR
SHIFT REPORT RECEIVED FROM NIGHT SHIFT NURSE. PT IS IN BED IN STABLE CONDITION. CALL LIGHT 
IN REACH.

## 2020-01-03 NOTE — NUR
RECEIVED REPORT FROM AM NURSE. PT, COOPERATIVE, ALERT AND ORIENTED X4, SITTING ON BED. WITH 
ABRAHAM CATHETER IN PLACE, DRAINING CLEAR, YELLOW URINE, SKIN WARM, DRY, INTACT, IV SITE ON 
LFA, 20G RUNNING NS AT 30 ML/HR, PT HAS A LEFT IJ BI CATH FOR HEMODIALYSIS. AV SHUNT ON 
RFA, BOARD UPDATED, POC REVIEWED AND DISCUSSED WITH PT, PT VERBALIZED UNDERSTANDING. BED IN 
LOW POSITION, CALL LIGHT WITHIN REACH.

## 2020-01-04 VITALS — DIASTOLIC BLOOD PRESSURE: 83 MMHG | SYSTOLIC BLOOD PRESSURE: 142 MMHG

## 2020-01-04 VITALS — DIASTOLIC BLOOD PRESSURE: 85 MMHG | SYSTOLIC BLOOD PRESSURE: 167 MMHG

## 2020-01-04 VITALS — SYSTOLIC BLOOD PRESSURE: 124 MMHG | DIASTOLIC BLOOD PRESSURE: 73 MMHG

## 2020-01-04 VITALS — DIASTOLIC BLOOD PRESSURE: 79 MMHG | SYSTOLIC BLOOD PRESSURE: 134 MMHG

## 2020-01-04 LAB
ANION GAP SERPL CALCULATED.3IONS-SCNC: 14.1 MMOL/L (ref 8–16)
BASOPHILS # BLD AUTO: 0 K/UL (ref 0–0.22)
BASOPHILS NFR BLD AUTO: 0.1 % (ref 0–2)
BUN SERPL-MCNC: 64 MG/DL (ref 7–18)
CHLORIDE SERPL-SCNC: 102 MMOL/L (ref 98–107)
CO2 SERPL-SCNC: 22.6 MMOL/L (ref 21–32)
CREAT SERPL-MCNC: 5.5 MG/DL (ref 0.7–1.3)
EOSINOPHIL # BLD AUTO: 0 K/UL (ref 0–0.4)
EOSINOPHIL NFR BLD AUTO: 0.4 % (ref 0–4)
ERYTHROCYTE [DISTWIDTH] IN BLOOD BY AUTOMATED COUNT: 23.3 % (ref 11.6–13.7)
GFR SERPL CREATININE-BSD FRML MDRD: 14 ML/MIN (ref 90–?)
GLUCOSE SERPL-MCNC: 100 MG/DL (ref 74–106)
HCT VFR BLD AUTO: 25.8 % (ref 36–52)
HGB BLD-MCNC: 8.7 G/DL (ref 12–18)
LYMPHOCYTES # BLD AUTO: 0.7 K/UL (ref 2–11.5)
LYMPHOCYTES NFR BLD AUTO: 10 % (ref 20.5–51.1)
MAGNESIUM SERPL-MCNC: 2.5 MG/DL (ref 1.8–2.4)
MCH RBC QN AUTO: 35 PG (ref 27–31)
MCHC RBC AUTO-ENTMCNC: 34 G/DL (ref 33–37)
MCV RBC AUTO: 104.7 FL (ref 80–94)
MONOCYTES # BLD AUTO: 0.7 K/UL (ref 0.8–1)
MONOCYTES NFR BLD AUTO: 9.1 % (ref 1.7–9.3)
NEUTROPHILS # BLD AUTO: 5.8 K/UL (ref 1.8–7.7)
NEUTROPHILS NFR BLD AUTO: 80.4 % (ref 42.2–75.2)
PHOSPHATE SERPL-MCNC: 4.6 MG/DL (ref 2.5–4.9)
PLATELET # BLD AUTO: 216 K/UL (ref 140–450)
POTASSIUM SERPL-SCNC: 3.7 MMOL/L (ref 3.5–5.1)
RBC # BLD AUTO: 2.47 MIL/UL (ref 4.2–6.1)
SODIUM SERPL-SCNC: 135 MMOL/L (ref 136–145)
WBC # BLD AUTO: 7.2 K/UL (ref 4.8–10.8)

## 2020-01-04 PROCEDURE — 02HV33Z INSERTION OF INFUSION DEVICE INTO SUPERIOR VENA CAVA, PERCUTANEOUS APPROACH: ICD-10-PCS | Performed by: SURGERY

## 2020-01-04 PROCEDURE — 0JH63XZ INSERTION OF TUNNELED VASCULAR ACCESS DEVICE INTO CHEST SUBCUTANEOUS TISSUE AND FASCIA, PERCUTANEOUS APPROACH: ICD-10-PCS | Performed by: SURGERY

## 2020-01-04 PROCEDURE — B5181ZA FLUOROSCOPY OF SUPERIOR VENA CAVA USING LOW OSMOLAR CONTRAST, GUIDANCE: ICD-10-PCS | Performed by: SURGERY

## 2020-01-04 PROCEDURE — 5A1D70Z PERFORMANCE OF URINARY FILTRATION, INTERMITTENT, LESS THAN 6 HOURS PER DAY: ICD-10-PCS | Performed by: GENERAL PRACTICE

## 2020-01-04 PROCEDURE — B548ZZA ULTRASONOGRAPHY OF SUPERIOR VENA CAVA, GUIDANCE: ICD-10-PCS | Performed by: SURGERY

## 2020-01-04 PROCEDURE — 05PYX3Z REMOVAL OF INFUSION DEVICE FROM UPPER VEIN, EXTERNAL APPROACH: ICD-10-PCS | Performed by: SURGERY

## 2020-01-04 RX ADMIN — CYCLOSPORINE SCH MG: 25 CAPSULE, LIQUID FILLED ORAL at 14:06

## 2020-01-04 RX ADMIN — SODIUM CHLORIDE SCH MLS/HR: 9 INJECTION, SOLUTION INTRAVENOUS at 06:00

## 2020-01-04 RX ADMIN — CARVEDILOL SCH MG: 3.12 TABLET, FILM COATED ORAL at 09:00

## 2020-01-04 RX ADMIN — MORPHINE SULFATE PRN MG: 2 INJECTION, SOLUTION INTRAMUSCULAR; INTRAVENOUS at 14:59

## 2020-01-04 RX ADMIN — ATORVASTATIN CALCIUM SCH MG: 20 TABLET, FILM COATED ORAL at 20:44

## 2020-01-04 RX ADMIN — FUROSEMIDE SCH MG: 10 INJECTION, SOLUTION INTRAMUSCULAR; INTRAVENOUS at 09:54

## 2020-01-04 RX ADMIN — OXYCODONE HYDROCHLORIDE AND ACETAMINOPHEN SCH MG: 500 TABLET ORAL at 09:00

## 2020-01-04 RX ADMIN — Medication SCH MG: at 09:00

## 2020-01-04 RX ADMIN — CARVEDILOL SCH MG: 3.12 TABLET, FILM COATED ORAL at 20:42

## 2020-01-04 NOTE — NUR
Pt c/o feeling "increasing swelling" to his right neck. Inspected site & noted slightly 
increased swelling to right side of neck, no dark discoloration, no bleeding noted. Ice pack 
& sand bag remains in place. Pt denies any SOB. Dr Francis notified & inspected site. Per , 
cont ice pack & sandbag, transfer to tele. Tele monitor applied. Pt resting in bed at this 
time, respirations even & nonlabored on room air. Call light within reach. Family at 
bedside.

## 2020-01-04 NOTE — NUR
PT GIVEN ORDERED COREG AND LIPITOR, EDUCATION REGARDING MEDICATION PROVIDED AT BEDSIDE 
INCLUDING SIDE EFFECTS. PT VERBALIZED UNDERSTANDING.

## 2020-01-04 NOTE — NUR
Pt stated not being able to take their azathioprine, cyclosporine, or predisone PO without 
food. Informed Dr. Bowman of pt concern.

## 2020-01-04 NOTE — NUR
RECEIVED REPORT FROM LATONIA RN AND KATHLEEN RN DAYSHIFT NURSES AT BEDSIDE FOR CONTINUITY OF 
CARE, PT IN STABLE CONDITION,

## 2020-01-04 NOTE — NUR
PT SITTING UP IN CHAIR WITH FAMILY AT BEDSIDE. PT IS AOX3-4, AWAKE AND ALERT. HIS RIGHT SIDE 
OF HIS NECK WAS NOTED WITH SWELLING AND A SMALL BANDAGE WITH VERY MINIMAL DRAINAGE. PT 
DECLINED TO USE THE SANDBAG OR ICE PACK AT THIS TIME. HE SAID THAT HE WAS TIRED AND THAT HE 
WANTED TO TRY AND EAT SOME SUPPER , BUT FELT A LITTLE NAUSEA. PT DECLINED OFFERED ZOFRAN, HE 
ALSO SAID HE HAD SOME MILD DISCOMFORT , BUT DECLINED ANY PAIN MEDICATION AS WELL.  V/S AS 
FOLLOWS; T 97.9 P 64 R 18 B/P 167/85 02 99% ON ROOM AIR. PT IV SITE LEFT F/A 20G IS INTACT 
AND ASYMPTOMATIC RUNNING NORMAL SALINE AT 30MLS/HR. PT DIALYSIS CATHETER IS HANGING ON LEFT 
CHEST INTACT AND ASYMPTOMATIC AS WELL. PT VOIDED 200MLS OF YELLOW URINE. ALL FALLS 
PRECAUTIONS IN PLACE.

## 2020-01-04 NOTE — NUR
B/P RETAKEN AT BEDSIDE AND IT /51, A SMALL IMPROVEMENT FROM 167/85. WILL RETAKE 
VITALS SIGNS LATER, PT IN BED RESTING WITH EYES CLOSED NO S/S OF PAIN OR DISTRESS NOTED. IV 
SITE INTACT AND ASYMPTOMATIC RUNNING N/S AT 30, ALL FALLS PRECAUTIONS IN PLACE.

## 2020-01-04 NOTE — NUR
RECEIVED CALL FROM LAB FOR BUN 64, CR 5.5. NOT REPORTED TO  D/T DIALYSIS PT. WILL CONTINUE 
TO MONITOR.

## 2020-01-04 NOTE — NUR
Pt complained of pain at hematoma site, right neck. 10/10 pain, administered PRN morphine. 
Will reassess pain.

## 2020-01-04 NOTE — NUR
Pt is back from surgery and is waiting for an x-ray for placement and hemodialysis. Pt is 
stable with vitals: 142/85 bp, 66 pulse, 97.9 temperature, 16 respirations, no complaints of 
pain. Pt's family is at bedside, bed at lowest position, call light within reach, 
non-labored breathing on room air, 30 mls/hr NS running.

## 2020-01-04 NOTE — NUR
Reassessed pt's pain at hematoma site. Pt stated that the pain is much more tolerable, but 
still at a 9/10. Will continue to monitor.

## 2020-01-04 NOTE — NUR
Pt started on hemodialysis from dialysis nurse, Markus. Pt is in stable condition, on room air, 
bed at lowest position, NS running at 30 mls/hr, call light within reach. Pt's family is at 
bedside.

## 2020-01-04 NOTE — NUR
Pt stated feeling anxious and wanted to end hemodialysis at 2 hours and 10 minutes into the 
procedure, when it was supposed to be a total of 3 hours. As per pt request, the procedure 
was ended despite the dialysis nurse, Markus, educating the pt on the risk of not continuing 
the procedure.

## 2020-01-04 NOTE — NUR
Pt is picked up from OR team for hemodialysis catheter insertion at 1045. Pt shows no signs 
of distress.

## 2020-01-04 NOTE — NUR
Received report from PM nurse, Tutu. Pt is running 30 mls/hr, on room air, bed at lowest 
position, call light within reach. Pt's son is at bedside.

## 2020-01-05 VITALS — SYSTOLIC BLOOD PRESSURE: 163 MMHG | DIASTOLIC BLOOD PRESSURE: 89 MMHG

## 2020-01-05 VITALS — SYSTOLIC BLOOD PRESSURE: 154 MMHG | DIASTOLIC BLOOD PRESSURE: 68 MMHG

## 2020-01-05 VITALS — DIASTOLIC BLOOD PRESSURE: 50 MMHG | SYSTOLIC BLOOD PRESSURE: 141 MMHG

## 2020-01-05 VITALS — SYSTOLIC BLOOD PRESSURE: 186 MMHG | DIASTOLIC BLOOD PRESSURE: 68 MMHG

## 2020-01-05 VITALS — SYSTOLIC BLOOD PRESSURE: 157 MMHG | DIASTOLIC BLOOD PRESSURE: 67 MMHG

## 2020-01-05 VITALS — SYSTOLIC BLOOD PRESSURE: 168 MMHG | DIASTOLIC BLOOD PRESSURE: 112 MMHG

## 2020-01-05 LAB
ANION GAP SERPL CALCULATED.3IONS-SCNC: 13.6 MMOL/L (ref 8–16)
BASOPHILS # BLD AUTO: 0 K/UL (ref 0–0.22)
BASOPHILS NFR BLD AUTO: 0.2 % (ref 0–2)
BUN SERPL-MCNC: 41 MG/DL (ref 7–18)
CHLORIDE SERPL-SCNC: 105 MMOL/L (ref 98–107)
CO2 SERPL-SCNC: 25.2 MMOL/L (ref 21–32)
CREAT SERPL-MCNC: 4.3 MG/DL (ref 0.7–1.3)
EOSINOPHIL # BLD AUTO: 0 K/UL (ref 0–0.4)
EOSINOPHIL NFR BLD AUTO: 0.5 % (ref 0–4)
ERYTHROCYTE [DISTWIDTH] IN BLOOD BY AUTOMATED COUNT: 22.9 % (ref 11.6–13.7)
GFR SERPL CREATININE-BSD FRML MDRD: 18 ML/MIN (ref 90–?)
GLUCOSE SERPL-MCNC: 95 MG/DL (ref 74–106)
HCT VFR BLD AUTO: 22.7 % (ref 36–52)
HGB BLD-MCNC: 7.4 G/DL (ref 12–18)
LYMPHOCYTES # BLD AUTO: 0.7 K/UL (ref 2–11.5)
LYMPHOCYTES NFR BLD AUTO: 10.5 % (ref 20.5–51.1)
MAGNESIUM SERPL-MCNC: 2.1 MG/DL (ref 1.8–2.4)
MCH RBC QN AUTO: 34 PG (ref 27–31)
MCHC RBC AUTO-ENTMCNC: 33 G/DL (ref 33–37)
MCV RBC AUTO: 105.1 FL (ref 80–94)
MONOCYTES # BLD AUTO: 0.7 K/UL (ref 0.8–1)
MONOCYTES NFR BLD AUTO: 9.6 % (ref 1.7–9.3)
NEUTROPHILS # BLD AUTO: 5.6 K/UL (ref 1.8–7.7)
NEUTROPHILS NFR BLD AUTO: 79.2 % (ref 42.2–75.2)
PHOSPHATE SERPL-MCNC: 4.8 MG/DL (ref 2.5–4.9)
PLATELET # BLD AUTO: 192 K/UL (ref 140–450)
POTASSIUM SERPL-SCNC: 3.8 MMOL/L (ref 3.5–5.1)
RBC # BLD AUTO: 2.16 MIL/UL (ref 4.2–6.1)
SODIUM SERPL-SCNC: 140 MMOL/L (ref 136–145)
WBC # BLD AUTO: 7.1 K/UL (ref 4.8–10.8)

## 2020-01-05 RX ADMIN — FUROSEMIDE SCH MG: 10 INJECTION, SOLUTION INTRAMUSCULAR; INTRAVENOUS at 08:08

## 2020-01-05 RX ADMIN — Medication SCH MG: at 08:07

## 2020-01-05 RX ADMIN — CYCLOSPORINE SCH MG: 25 CAPSULE, LIQUID FILLED ORAL at 08:09

## 2020-01-05 RX ADMIN — ATORVASTATIN CALCIUM SCH MG: 20 TABLET, FILM COATED ORAL at 21:14

## 2020-01-05 RX ADMIN — SODIUM CHLORIDE SCH MLS/HR: 9 INJECTION, SOLUTION INTRAVENOUS at 06:00

## 2020-01-05 RX ADMIN — OXYCODONE HYDROCHLORIDE AND ACETAMINOPHEN SCH MG: 500 TABLET ORAL at 08:07

## 2020-01-05 RX ADMIN — ACETAMINOPHEN PRN MG: 325 TABLET ORAL at 06:22

## 2020-01-05 RX ADMIN — CARVEDILOL SCH MG: 3.12 TABLET, FILM COATED ORAL at 21:14

## 2020-01-05 RX ADMIN — CARVEDILOL SCH MG: 3.12 TABLET, FILM COATED ORAL at 08:07

## 2020-01-05 NOTE — NUR
RECEIVED REPORT FROM AM NURSE. PT AT BED AWAKE, FAMILY AT BEDSIDE, PERRLA 3MM, BRISK, PT 
CALM, COOPERATIVE, HEART RATE REGULAR, S1S2 PRESENT, CAP REFILL <3S, PULSES 2+ BILATERAL 
UPPER AND LOWER EXTREMITIES, LUNG SOUNDS CLEAR THROUGHOUT, PT BREATHING REGULARLY IN ROOM 
AIR. ABDOMEN, SOFT, ROUND, NONDISTENDED, NONTENDER. BLADDER, SOFT, ROUND, NONDISTENDED, NON 
TENDER, PT HAS GENERALIZED WEAKNESS, SKIN INTACT, WARM, DRY, PT HAS RIGHT UPPER CHEST 
CLAVICLE AREA HEMATOMA, DRESSING IN PLACE, PT HAS LEFT BI CATHETER, PERIPHERAL IV ON 
LEFT FA 20 GAUGE, RUNNING 30 ML/HR. HOB 30 DEGREES, SIDE RAILS UP X2, BED AT LOWEST 
POSITION.

## 2020-01-05 NOTE — NUR
PT IN BED SLEEPING NO S/S OF PAIN OR DISTRESS NOTED. V/S AS FOLLOWS: T 98.7 P 67 R 18 B/P 
158/82 02 98% ON ROOM AIR . ALL FALLS PRECAUTIONS IN PLACE.

## 2020-01-05 NOTE — NUR
PT REQUESTED WARM BLANKETS. PT AT BED BREATHING REGULARLY ON ROOM AIR.

-------------------------------------------------------------------------------

Addendum: 01/06/20 at 0411 by Jorge Benton RN

-------------------------------------------------------------------------------

ERROR TIME

## 2020-01-05 NOTE — NUR
PT IS IN BED IN STABLE CONDITION. NOTED WITH SWELLING TO RIGHT CLAVICLE AREA. NO COMPLAINS 
OF PAIN. CALL LIGHT IN REACH.

## 2020-01-05 NOTE — NUR
PT IS SITTING IN CHAIR. NO DISTRESS NOTED. NO COMPLAINS REPORTED. FAMILY BE BEDSIDE. CALL 
LIGHT IN REACH.

## 2020-01-05 NOTE — NUR
SHIFT REPORT GIVEN TO NIGHT SHIFT NURSE. PT IS IN STABLE CONDITION. FAMILY BY BEDSIDE. CALL 
LIGHT IN REACH.

## 2020-01-06 VITALS — DIASTOLIC BLOOD PRESSURE: 64 MMHG | SYSTOLIC BLOOD PRESSURE: 106 MMHG

## 2020-01-06 VITALS — SYSTOLIC BLOOD PRESSURE: 126 MMHG | DIASTOLIC BLOOD PRESSURE: 60 MMHG

## 2020-01-06 VITALS — SYSTOLIC BLOOD PRESSURE: 136 MMHG | DIASTOLIC BLOOD PRESSURE: 54 MMHG

## 2020-01-06 VITALS — SYSTOLIC BLOOD PRESSURE: 141 MMHG | DIASTOLIC BLOOD PRESSURE: 58 MMHG

## 2020-01-06 VITALS — DIASTOLIC BLOOD PRESSURE: 73 MMHG | SYSTOLIC BLOOD PRESSURE: 123 MMHG

## 2020-01-06 LAB
ANION GAP SERPL CALCULATED.3IONS-SCNC: 13.9 MMOL/L (ref 8–16)
BASOPHILS # BLD AUTO: 0 K/UL (ref 0–0.22)
BASOPHILS NFR BLD AUTO: 0.2 % (ref 0–2)
BUN SERPL-MCNC: 48 MG/DL (ref 7–18)
CHLORIDE SERPL-SCNC: 104 MMOL/L (ref 98–107)
CO2 SERPL-SCNC: 23.8 MMOL/L (ref 21–32)
CREAT SERPL-MCNC: 4.9 MG/DL (ref 0.7–1.3)
EOSINOPHIL # BLD AUTO: 0.1 K/UL (ref 0–0.4)
EOSINOPHIL NFR BLD AUTO: 1.1 % (ref 0–4)
ERYTHROCYTE [DISTWIDTH] IN BLOOD BY AUTOMATED COUNT: 22.8 % (ref 11.6–13.7)
GFR SERPL CREATININE-BSD FRML MDRD: 16 ML/MIN (ref 90–?)
GLUCOSE SERPL-MCNC: 88 MG/DL (ref 74–106)
HCT VFR BLD AUTO: 21.8 % (ref 36–52)
HGB BLD-MCNC: 7.2 G/DL (ref 12–18)
LYMPHOCYTES # BLD AUTO: 0.9 K/UL (ref 2–11.5)
LYMPHOCYTES NFR BLD AUTO: 12.9 % (ref 20.5–51.1)
MAGNESIUM SERPL-MCNC: 2.1 MG/DL (ref 1.8–2.4)
MCH RBC QN AUTO: 35 PG (ref 27–31)
MCHC RBC AUTO-ENTMCNC: 33 G/DL (ref 33–37)
MCV RBC AUTO: 106.3 FL (ref 80–94)
MONOCYTES # BLD AUTO: 0.6 K/UL (ref 0.8–1)
MONOCYTES NFR BLD AUTO: 9.2 % (ref 1.7–9.3)
NEUTROPHILS # BLD AUTO: 5.3 K/UL (ref 1.8–7.7)
NEUTROPHILS NFR BLD AUTO: 76.6 % (ref 42.2–75.2)
PHOSPHATE SERPL-MCNC: 4.5 MG/DL (ref 2.5–4.9)
PLATELET # BLD AUTO: 180 K/UL (ref 140–450)
POTASSIUM SERPL-SCNC: 3.7 MMOL/L (ref 3.5–5.1)
RBC # BLD AUTO: 2.05 MIL/UL (ref 4.2–6.1)
SODIUM SERPL-SCNC: 138 MMOL/L (ref 136–145)
WBC # BLD AUTO: 6.9 K/UL (ref 4.8–10.8)

## 2020-01-06 PROCEDURE — 5A1D70Z PERFORMANCE OF URINARY FILTRATION, INTERMITTENT, LESS THAN 6 HOURS PER DAY: ICD-10-PCS | Performed by: GENERAL PRACTICE

## 2020-01-06 RX ADMIN — CARVEDILOL SCH MG: 3.12 TABLET, FILM COATED ORAL at 08:56

## 2020-01-06 RX ADMIN — FUROSEMIDE SCH MG: 10 INJECTION, SOLUTION INTRAMUSCULAR; INTRAVENOUS at 08:54

## 2020-01-06 RX ADMIN — OXYCODONE HYDROCHLORIDE AND ACETAMINOPHEN SCH MG: 500 TABLET ORAL at 08:56

## 2020-01-06 RX ADMIN — CYCLOSPORINE SCH MG: 25 CAPSULE, LIQUID FILLED ORAL at 08:58

## 2020-01-06 RX ADMIN — SODIUM CHLORIDE SCH MLS/HR: 9 INJECTION, SOLUTION INTRAVENOUS at 06:00

## 2020-01-06 NOTE — NUR
PT DISCHARGED HOME FOR SELF CARE. PT WAS GIVEN ALL DISCHARGE TEACHING AND PAPERWORK. ALL 
PAPERWORK SIGNED. PT AND FAMILY VERBALIZED UNDERSTANDING OF TEACHING. ALL PERSONAL 
BELONGINGS TAKEN WITH PT. IV REMOVED WITH TIP INTACT. WRIST BANDS REMOVED AND PLACED IN 
SHRED BIN. PT OWN MEDS PICKED UP FROM PHARMACY BEFORE D/C. PT LEFT IN STABLE CONDITION 
ACCOMPANIED BY FAMILY.

## 2020-05-15 ENCOUNTER — HOSPITAL ENCOUNTER (INPATIENT)
Dept: HOSPITAL 26 - MED | Age: 60
LOS: 6 days | Discharge: HOME | DRG: 280 | End: 2020-05-21
Attending: GENERAL PRACTICE | Admitting: GENERAL PRACTICE
Payer: COMMERCIAL

## 2020-05-15 VITALS — BODY MASS INDEX: 19.78 KG/M2 | HEIGHT: 67 IN | WEIGHT: 126 LBS

## 2020-05-15 VITALS — SYSTOLIC BLOOD PRESSURE: 122 MMHG | DIASTOLIC BLOOD PRESSURE: 71 MMHG

## 2020-05-15 DIAGNOSIS — Z94.0: ICD-10-CM

## 2020-05-15 DIAGNOSIS — D53.9: ICD-10-CM

## 2020-05-15 DIAGNOSIS — Z85.828: ICD-10-CM

## 2020-05-15 DIAGNOSIS — R64: ICD-10-CM

## 2020-05-15 DIAGNOSIS — N18.6: ICD-10-CM

## 2020-05-15 DIAGNOSIS — E83.39: ICD-10-CM

## 2020-05-15 DIAGNOSIS — T82.7XXA: Primary | ICD-10-CM

## 2020-05-15 DIAGNOSIS — D61.818: ICD-10-CM

## 2020-05-15 DIAGNOSIS — E07.9: ICD-10-CM

## 2020-05-15 DIAGNOSIS — D46.9: ICD-10-CM

## 2020-05-15 DIAGNOSIS — I25.10: ICD-10-CM

## 2020-05-15 DIAGNOSIS — Z79.899: ICD-10-CM

## 2020-05-15 DIAGNOSIS — I50.43: ICD-10-CM

## 2020-05-15 DIAGNOSIS — I21.A1: ICD-10-CM

## 2020-05-15 DIAGNOSIS — B96.89: ICD-10-CM

## 2020-05-15 DIAGNOSIS — Z99.2: ICD-10-CM

## 2020-05-15 DIAGNOSIS — I12.0: ICD-10-CM

## 2020-05-15 DIAGNOSIS — K56.41: ICD-10-CM

## 2020-05-15 DIAGNOSIS — Z91.19: ICD-10-CM

## 2020-05-15 DIAGNOSIS — L98.9: ICD-10-CM

## 2020-05-15 DIAGNOSIS — A41.51: ICD-10-CM
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
## 2020-05-15 DIAGNOSIS — K92.2: ICD-10-CM

## 2020-05-15 DIAGNOSIS — E43: ICD-10-CM

## 2020-05-15 LAB
ALBUMIN FLD-MCNC: 2.2 G/DL (ref 3.4–5)
ANION GAP SERPL CALCULATED.3IONS-SCNC: 20.4 MMOL/L (ref 8–16)
AST SERPL-CCNC: 17 U/L (ref 15–37)
BILIRUB SERPL-MCNC: 1.1 MG/DL (ref 0–1)
BUN SERPL-MCNC: 128 MG/DL (ref 7–18)
CHLORIDE SERPL-SCNC: 100 MMOL/L (ref 98–107)
CO2 SERPL-SCNC: 23.5 MMOL/L (ref 21–32)
CREAT SERPL-MCNC: 8.6 MG/DL (ref 0.6–1.3)
EOSINOPHIL NFR BLD MANUAL: 5 % (ref 0–4)
ERYTHROCYTE [DISTWIDTH] IN BLOOD BY AUTOMATED COUNT: 24 % (ref 11.6–13.7)
GFR SERPL CREATININE-BSD FRML MDRD: 8 ML/MIN (ref 90–?)
GLUCOSE SERPL-MCNC: 122 MG/DL (ref 74–106)
HCT VFR BLD AUTO: 11.5 % (ref 36–52)
HGB BLD-MCNC: 3.8 G/DL (ref 12–18)
LYMPHOCYTES NFR BLD MANUAL: 6 % (ref 20–46)
MCH RBC QN AUTO: 38 PG (ref 27–31)
MCHC RBC AUTO-ENTMCNC: 33 G/DL (ref 33–37)
MCV RBC AUTO: 113.8 FL (ref 80–94)
MONOCYTES NFR BLD MANUAL: 2 % (ref 5–12)
PLATELET # BLD AUTO: 149 K/UL (ref 140–450)
POTASSIUM SERPL-SCNC: 4.9 MMOL/L (ref 3.5–5.1)
RBC # BLD AUTO: 1.01 MIL/UL (ref 4.2–6.1)
SODIUM SERPL-SCNC: 139 MMOL/L (ref 136–145)
WBC # BLD AUTO: 1.4 K/UL (ref 4.8–10.8)

## 2020-05-15 PROCEDURE — P9016 RBC LEUKOCYTES REDUCED: HCPCS

## 2020-05-15 RX ADMIN — DEXTROSE AND SODIUM CHLORIDE SCH MLS/HR: 5; .9 INJECTION, SOLUTION INTRAVENOUS at 23:46

## 2020-05-15 NOTE — NUR
58 YO M BIB SELF FOR C/C OF 5/10 ABDOMINAL AND RECTAL PAIN X20 DAYS. PT STATES 
THE PAIN BEGAN WORSENING 5 DAYS AGO. HE IS UNABLE TO EAT FOR 2 WEEKS. PT STATES 
HE IS CONSTIPATED. PT IS A DIALYSIS PT AND GETS DIALYSIS ON MON AND FRI BUT DID 
NOT GO TODAY. PER DAUGHTER- PT IS DIAPERED AND HAS HAD RECTAL BLEEDING TODAY. 
DIALYSIS CATH WAS CHANGED ON 5/12 AND IS TUNNELED IN THE LEFT SIDE OF HIS 
CHEST. UPON INSPECTION OF RECTUM NO REDNESS OR OCCULT BLOOS VISUALIZED. PT 
PRESENTS WITH A STAGE 1 PRESSURE ULCER ON LEFT BUTTOCK. NO COUGH, FEVER, SOB, 
OR TRAVEL. BED LOCKED AND IN LOWEST POSITION. SIDE RAILS X2. PT ON CARDIAC 
MONITOR.



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 

RX: TYLENOL WITH CODENINE, NARCOTICS (UNABLE TO VERBALIZE WHICH ONES)

NKA

## 2020-05-16 VITALS — DIASTOLIC BLOOD PRESSURE: 66 MMHG | SYSTOLIC BLOOD PRESSURE: 125 MMHG

## 2020-05-16 VITALS — DIASTOLIC BLOOD PRESSURE: 45 MMHG | SYSTOLIC BLOOD PRESSURE: 110 MMHG

## 2020-05-16 VITALS — DIASTOLIC BLOOD PRESSURE: 82 MMHG | SYSTOLIC BLOOD PRESSURE: 142 MMHG

## 2020-05-16 VITALS — DIASTOLIC BLOOD PRESSURE: 81 MMHG | SYSTOLIC BLOOD PRESSURE: 149 MMHG

## 2020-05-16 VITALS — DIASTOLIC BLOOD PRESSURE: 63 MMHG | SYSTOLIC BLOOD PRESSURE: 142 MMHG

## 2020-05-16 VITALS — SYSTOLIC BLOOD PRESSURE: 149 MMHG | DIASTOLIC BLOOD PRESSURE: 82 MMHG

## 2020-05-16 VITALS — SYSTOLIC BLOOD PRESSURE: 152 MMHG | DIASTOLIC BLOOD PRESSURE: 65 MMHG

## 2020-05-16 VITALS — SYSTOLIC BLOOD PRESSURE: 157 MMHG | DIASTOLIC BLOOD PRESSURE: 80 MMHG

## 2020-05-16 VITALS — DIASTOLIC BLOOD PRESSURE: 71 MMHG | SYSTOLIC BLOOD PRESSURE: 116 MMHG

## 2020-05-16 VITALS — DIASTOLIC BLOOD PRESSURE: 56 MMHG | SYSTOLIC BLOOD PRESSURE: 90 MMHG

## 2020-05-16 VITALS — SYSTOLIC BLOOD PRESSURE: 152 MMHG | DIASTOLIC BLOOD PRESSURE: 89 MMHG

## 2020-05-16 LAB
ALBUMIN FLD-MCNC: 2 G/DL (ref 3.4–5)
ANION GAP SERPL CALCULATED.3IONS-SCNC: 18.6 MMOL/L (ref 8–16)
AST SERPL-CCNC: 11 U/L (ref 15–37)
BASOPHILS NFR BLD MANUAL: 0 % (ref 0–2)
BILIRUB SERPL-MCNC: 1.1 MG/DL (ref 0–1)
BUN SERPL-MCNC: 126 MG/DL (ref 7–18)
CHLORIDE SERPL-SCNC: 101 MMOL/L (ref 98–107)
CHOLEST/HDLC SERPL: 6.8 {RATIO} (ref 1–4.5)
CO2 SERPL-SCNC: 23.2 MMOL/L (ref 21–32)
CREAT SERPL-MCNC: 8.5 MG/DL (ref 0.6–1.3)
EOSINOPHIL NFR BLD MANUAL: 1 % (ref 0–4)
EOSINOPHIL NFR BLD MANUAL: 5 % (ref 0–4)
ERYTHROCYTE [DISTWIDTH] IN BLOOD BY AUTOMATED COUNT: 16.5 % (ref 11.6–13.7)
ERYTHROCYTE [DISTWIDTH] IN BLOOD BY AUTOMATED COUNT: 22.3 % (ref 11.6–13.7)
GFR SERPL CREATININE-BSD FRML MDRD: 8 ML/MIN (ref 90–?)
GLUCOSE SERPL-MCNC: 129 MG/DL (ref 74–106)
HCT VFR BLD AUTO: 17.7 % (ref 36–52)
HCT VFR BLD AUTO: 25.1 % (ref 36–52)
HDLC SERPL-MCNC: 16 MG/DL (ref 40–60)
HGB BLD-MCNC: 6.1 G/DL (ref 12–18)
HGB BLD-MCNC: 8.7 G/DL (ref 12–18)
IRON SERPL-MCNC: 64 UG/DL (ref 50–175)
LDLC SERPL CALC-MCNC: 68 MG/DL (ref 60–100)
LYMPHOCYTES NFR BLD MANUAL: 12 % (ref 20–46)
LYMPHOCYTES NFR BLD MANUAL: 20 % (ref 20–46)
MAGNESIUM SERPL-MCNC: 3.4 MG/DL (ref 1.8–2.4)
MAGNESIUM SERPL-MCNC: 3.5 MG/DL (ref 1.8–2.4)
MCH RBC QN AUTO: 33 PG (ref 27–31)
MCH RBC QN AUTO: 35 PG (ref 27–31)
MCHC RBC AUTO-ENTMCNC: 34 G/DL (ref 33–37)
MCHC RBC AUTO-ENTMCNC: 35 G/DL (ref 33–37)
MCV RBC AUTO: 100.9 FL (ref 80–94)
MCV RBC AUTO: 95.1 FL (ref 80–94)
MONOCYTES NFR BLD MANUAL: 3 % (ref 5–12)
MONOCYTES NFR BLD MANUAL: 3 % (ref 5–12)
PHOSPHATE SERPL-MCNC: 6.5 MG/DL (ref 2.5–4.9)
PHOSPHATE SERPL-MCNC: 6.5 MG/DL (ref 2.5–4.9)
PLATELET # BLD AUTO: 113 K/UL (ref 140–450)
PLATELET # BLD AUTO: 80 K/UL (ref 140–450)
POTASSIUM SERPL-SCNC: 4.8 MMOL/L (ref 3.5–5.1)
PROTHROMBIN TIME: 13.3 SECS (ref 10.8–13.4)
RBC # BLD AUTO: 1.75 MIL/UL (ref 4.2–6.1)
RBC # BLD AUTO: 2.64 MIL/UL (ref 4.2–6.1)
SODIUM SERPL-SCNC: 138 MMOL/L (ref 136–145)
TIBC SERPL-MCNC: 144 UG/DL (ref 250–450)
TRIGL SERPL-MCNC: 126 MG/DL (ref 30–150)
TSH SERPL DL<=0.05 MIU/L-ACNC: 2.85 UIU/ML (ref 0.34–3.74)
WBC # BLD AUTO: 1.6 K/UL (ref 4.8–10.8)
WBC # BLD AUTO: 1.8 K/UL (ref 4.8–10.8)

## 2020-05-16 RX ADMIN — SODIUM CHLORIDE SCH MLS/HR: 9 INJECTION, SOLUTION INTRAVENOUS at 20:31

## 2020-05-16 RX ADMIN — LACTULOSE SCH GM: 10 SOLUTION ORAL at 17:00

## 2020-05-16 RX ADMIN — HYDROCODONE BITARTRATE AND ACETAMINOPHEN PRN TAB: 7.5; 325 TABLET ORAL at 08:48

## 2020-05-16 RX ADMIN — SENNOSIDES A AND B SCH MG: 8.6 TABLET, FILM COATED ORAL at 08:19

## 2020-05-16 RX ADMIN — SENNOSIDES A AND B SCH MG: 8.6 TABLET, FILM COATED ORAL at 13:00

## 2020-05-16 RX ADMIN — LACTULOSE SCH GM: 10 SOLUTION ORAL at 13:00

## 2020-05-16 RX ADMIN — CARVEDILOL SCH MG: 3.12 TABLET, FILM COATED ORAL at 08:18

## 2020-05-16 RX ADMIN — LACTULOSE SCH GM: 10 SOLUTION ORAL at 20:31

## 2020-05-16 RX ADMIN — FOLIC ACID SCH MG: 1 TABLET ORAL at 20:31

## 2020-05-16 RX ADMIN — DEXTROSE AND SODIUM CHLORIDE SCH MLS/HR: 5; .9 INJECTION, SOLUTION INTRAVENOUS at 13:44

## 2020-05-16 RX ADMIN — CARVEDILOL SCH MG: 3.12 TABLET, FILM COATED ORAL at 20:30

## 2020-05-16 RX ADMIN — HYDROCODONE BITARTRATE AND ACETAMINOPHEN PRN TAB: 7.5; 325 TABLET ORAL at 20:31

## 2020-05-16 RX ADMIN — POLYETHYLENE GLYCOL 3350 SCH GM: 17 POWDER, FOR SOLUTION ORAL at 20:32

## 2020-05-16 RX ADMIN — SENNOSIDES A AND B SCH MG: 8.6 TABLET, FILM COATED ORAL at 17:00

## 2020-05-16 RX ADMIN — LACTULOSE SCH GM: 10 SOLUTION ORAL at 08:18

## 2020-05-16 RX ADMIN — HYDROCODONE BITARTRATE AND ACETAMINOPHEN PRN TAB: 7.5; 325 TABLET ORAL at 15:45

## 2020-05-16 RX ADMIN — HYDROCORTISONE SODIUM SUCCINATE SCH MG: 100 INJECTION, POWDER, FOR SOLUTION INTRAMUSCULAR; INTRAVENOUS at 20:31

## 2020-05-16 RX ADMIN — WHITE PETROLATUM SCH EA: 57 PASTE TOPICAL at 13:40

## 2020-05-16 RX ADMIN — POLYETHYLENE GLYCOL 3350 SCH GM: 17 POWDER, FOR SOLUTION ORAL at 08:18

## 2020-05-16 RX ADMIN — FAMOTIDINE SCH MG: 20 TABLET ORAL at 08:18

## 2020-05-16 NOTE — NUR
Consent signed per patient agreeing to administration of blood.  Blood has been 
type and crossmatched.  Blood sent from blood bank.  Information on unit of 
blood checked against patient wristband at bedside by two nurses.  All 
information matches.  Patient or responsible party informed of potential 
complications associated with blood transfusion.  Informed of possible 
transfusion reaction symptoms.  Aware of need to notify nurse at once of 
itching, shortness of breath, flushing, feeling of impending doom, or other 
symptoms not previously present.  Vital signs taken within 5 minutes prior to 
initiation of transfusion.  RN will remain with patient for first 15 minutes of 
transfusion at which time vital signs will be re-assessed.

## 2020-05-16 NOTE — NUR
SPOKE TO ROEL (SON) AND HARVEY (DAUGHTER) ON THE PHONE AND RECEIVED INFORMATION TO MAYNOR 
HARVEY AS NEXT OF KIN ON FILE. RECEIVED PT CONSENT TO MAYNOR HARVEY AS PERSON OF CONTACT. CALLED 
ADMITTING AND GAVE INFORMATION TO BE UPDATED.

## 2020-05-16 NOTE — NUR
RECEIVED REPORT FROM NIGHT NURSE. PT AAOX4, NO DISTRESS NOTED, DENIES PAIN AT THIS TIME. BP 
134/71, P106, O2SAT 100%, RR16. PT RECEIVING SECOND BAG OF BLOOD TRANSFUSION AT 100ML/H IN L 
FA 20G, PATENT AND ASYMPTOMATIC, NO ADVERSE REACTIONS NOTICED. R AC 20G IN PLACE PATENT AND 
ASYMPTOMATIC, SALINE LOCKED. SKIN NON-INTACT WITH LEFT SIDE FACE WOUND FROM EROSIVE SKIN 
CANCER AND L BUTTOCKS WOUND, PICTURES IN CHART. PT IS NPO EXCEPT MEDS. SINUS TACHY ON 
MONITOR. RESPIRATIONS EVEN AND UNLABORED ON ROOM AIR. SAFETY MEASURES IN PLACE, CALL LIGHT 
WITHIN REACH, BED IN LOW POSITION. WILL CONTINUE TO MONITOR.

## 2020-05-16 NOTE — NUR
PT C/O ABD PAIN 7/10, PT STATES, " IT FEELS LIKE CRAMPING"  PRN NORCO GIVEN, WILL CONTINUE 
TO OBSERVE.

## 2020-05-16 NOTE — NUR
MEDICATIONS ADMINISTERED AS PER ORDER. PT TOLERATED WELL. NORCO GIVEN POR ABDOMINAL PAIN OF 
7. WILL CONTINUE TO ADMINISTER MAG CITRATE AS TOLERATED BY PT. RESPIRATIONS EVEN AND 
UNLABORED ON ROOM AIR. WILL CONTINUE TO MONITOR.

## 2020-05-16 NOTE — NUR
PATIENT HAS BEEN SCREENED AND CATEGORIZED AS HIGH NUTRITION RISK. PATIENT WILL BE SEEN 
WITHIN 1-2 DAYS OF ADMISSION.



05/16/20 05/17/20



LUTHER BUNN RD

## 2020-05-16 NOTE — NUR
PT AAOX3 FOLLOWING COMMANDS, DENIES LIGHTHEADED @ THIS TIME. ABLE TO MOVE X4 EXTREMITIES. PT 
SITTING UP IN BED @ THIS TIME. NSR 80-90S NO ECTOPY NOTED. LUNGS CLEAR DIMINISHED, ON ROOM 
AIR 98% SPO2. ABD SOFT NON DISTENDED. PT CO ABD PAIN @ THIS TIME, X2 BM ON DAY SHIFT NOTED. 
PT VOIDING INCONTINENT WITH URINAL DARK YELLOW URINE. SKIN INTACT @ THIS TIME. PERIPHERAL 
IVS TO L FOREARM L AC NOTED. NO BLEEDING @ THIS TIME. BED LOCKED IN LOWEST POSITION. HOB 
GREATER THAN 30 DEGREES. SAFETY PRECAUTIONS IN PLACE. 

-------------------------------------------------------------------------------

Addendum: 05/17/20 at 0636 by Thomas Mancia RN

-------------------------------------------------------------------------------

L CHEST TUNNELED CATH, NOTED, R UPPER EXTREM LIMB ALERT FISTULA PRESENT

## 2020-05-16 NOTE — NUR
PT IN BED, AWAKE AAOX4, DENIES ABDOMINAL PAIN AT THIS TIME. VITAL SIGNS WITHIN NORMAL 
LIMITS. NO RESPIRATORY DISTRESS NOTED

## 2020-05-16 NOTE — NUR
PT HAD BOWEL MOVEMENT, STOOL COLLECTED AND SENT TO LAB FOR OCCULT BLOOD. PT CLEANED AND 
REPOSITIONED. WOUND DRESSING CHANGE DONE AND ZGUARD APPLIED TO BUTTOCKS WOUND. DRESSING 
CHANGED FOR LEFT FACE/EAR WOUND, CLEANSED WITH NS, NEW DRESSING APPLIED.

## 2020-05-16 NOTE — NUR
PT RESTING COMFORTABLY IN BED. BED LOCKED AND IN LOWEST POSITION. SIDE RAILS 
X2. EQUAL CHEST RISE AND FALL.

## 2020-05-16 NOTE — NUR
REASSESSED PT, PT HAS EYES CLOSED, AROUSABLE, DENIES PAIN @THIS TIME. PT STATES 0/10 PAIN. 
WILL CONTINUE TO OBSERVE

## 2020-05-16 NOTE — NUR
2 NURSE VERIFICATION DONE FOR BLOOD TRANSFUSION AND GIVEN TO HEMODIALYSIS NURSE TO TRANSFUSE 
DURING HEMODIALYSIS. WILL CONTINUE TO MONITOR.

## 2020-05-16 NOTE — NUR
5/16/20 

RD INITIAL ASSESSMENT COMPLETED



PLEASE REFER TO NUTRITION ASSESSMENT UNDER CARE ACTIVITY FOR ESTIMATED NUTRITIONAL NEEDS. 



RD RECOMMENDATIONS:

1. CONTINUE FULL LIQUIED DIET AS TOLERATED, GRADUALLY ADVANCE TO RENAL DIET  

2. RECOMMEND REZA BID DAY FOR WOUND HEALING, WILL PROVIDE 160 CALORIES AND 5G OF PROTEIN

3. RECOMMEND PROSOURCE BID FOR WOUND HEALING, WILL PROVIDE 120 CALORIES AND 30 G OF PROTEIN

4. RD TO FOLLOW-UP 2-3 DAYS, HIGH RISK 



LUTHER BUNN, CARLOS

## 2020-05-16 NOTE — NUR
PT ADMITTED FROM ER. PT ALERT AND ORIENTED X4. ABLE TO VOICE HIS NEEDS TO STAFF. ROOM AIR, 
RESPIRATION EVEN AND UNLABORED. ORAL MUCOSA PINK AND MOIST. SKIN WARM AND DRY. BOWEL SOUNDS 
ACTIVE, LUNG SOUNDS CLEAR UPON AUSCULTATION. OPEN WOUND OBSERVED TO LEFT BUTTOCKS AND SIDE 
OF THE FACE. CALL LIGHT WITHIN REACH. NO KNOWN ALLERGY. NO FAMILY HISTORY PER PT. MRSA SWAB 
OBTAINED. PT GAIT UNSTEADY DUE TO LOWER EXTREMITY WEAKNESS. HAS PERIPHERAL LINE TO LEFT 
FOREARM 18G, LEFT AC 20G, LEFT CHEST TUNNELED CATH. RIGHT ARM RESTRICTED DUE TO OLD FISTULA. 
1 UNIT OF PRBC INFUSING WITH NO SIGNS OF DISTRESS OBSERVED. WILL CONTINUE TO MONITOR.

## 2020-05-16 NOTE — NUR
Patient will be [facility]. Admited to icu.  Will go to room 5. 
Belongings list completed.  Report to JEANETTE villafana.

## 2020-05-17 VITALS — DIASTOLIC BLOOD PRESSURE: 80 MMHG | SYSTOLIC BLOOD PRESSURE: 150 MMHG

## 2020-05-17 VITALS — DIASTOLIC BLOOD PRESSURE: 69 MMHG | SYSTOLIC BLOOD PRESSURE: 109 MMHG

## 2020-05-17 VITALS — DIASTOLIC BLOOD PRESSURE: 82 MMHG | SYSTOLIC BLOOD PRESSURE: 150 MMHG

## 2020-05-17 VITALS — DIASTOLIC BLOOD PRESSURE: 77 MMHG | SYSTOLIC BLOOD PRESSURE: 135 MMHG

## 2020-05-17 VITALS — SYSTOLIC BLOOD PRESSURE: 134 MMHG | DIASTOLIC BLOOD PRESSURE: 65 MMHG

## 2020-05-17 VITALS — DIASTOLIC BLOOD PRESSURE: 83 MMHG | SYSTOLIC BLOOD PRESSURE: 126 MMHG

## 2020-05-17 VITALS — SYSTOLIC BLOOD PRESSURE: 140 MMHG | DIASTOLIC BLOOD PRESSURE: 84 MMHG

## 2020-05-17 LAB
ANION GAP SERPL CALCULATED.3IONS-SCNC: 17.8 MMOL/L (ref 8–16)
BUN SERPL-MCNC: 53 MG/DL (ref 7–18)
CHLORIDE SERPL-SCNC: 104 MMOL/L (ref 98–107)
CO2 SERPL-SCNC: 24.9 MMOL/L (ref 21–32)
CREAT SERPL-MCNC: 4.3 MG/DL (ref 0.6–1.3)
FERRITIN SERPL-MCNC: 1358 NG/ML (ref 30–400)
FOLATE SERPL-MCNC: 13.7 NG/ML (ref 3–?)
GFR SERPL CREATININE-BSD FRML MDRD: 18 ML/MIN (ref 90–?)
GLUCOSE SERPL-MCNC: 131 MG/DL (ref 74–106)
MAGNESIUM SERPL-MCNC: 1.6 MG/DL (ref 1.8–2.4)
PHOSPHATE SERPL-MCNC: 2.9 MG/DL (ref 2.5–4.9)
POTASSIUM SERPL-SCNC: 4.7 MMOL/L (ref 3.5–5.1)
SODIUM SERPL-SCNC: 142 MMOL/L (ref 136–145)
TRANSFERRIN SERPL-MCNC: 123 MG/DL (ref 200–370)
VIT B12 SERPL-MCNC: 1665 PG/ML (ref 211–946)

## 2020-05-17 RX ADMIN — FAMOTIDINE SCH MG: 20 TABLET ORAL at 13:09

## 2020-05-17 RX ADMIN — HYDROCODONE BITARTRATE AND ACETAMINOPHEN PRN TAB: 7.5; 325 TABLET ORAL at 21:36

## 2020-05-17 RX ADMIN — SODIUM CHLORIDE SCH MLS/HR: 9 INJECTION, SOLUTION INTRAVENOUS at 21:36

## 2020-05-17 RX ADMIN — FOLIC ACID SCH MG: 1 TABLET ORAL at 21:36

## 2020-05-17 RX ADMIN — Medication SCH TAB: at 16:16

## 2020-05-17 RX ADMIN — SENNOSIDES A AND B SCH MG: 8.6 TABLET, FILM COATED ORAL at 21:36

## 2020-05-17 RX ADMIN — HYDROCORTISONE SODIUM SUCCINATE SCH MG: 100 INJECTION, POWDER, FOR SOLUTION INTRAMUSCULAR; INTRAVENOUS at 13:10

## 2020-05-17 RX ADMIN — HYDROCORTISONE SODIUM SUCCINATE SCH MG: 100 INJECTION, POWDER, FOR SOLUTION INTRAMUSCULAR; INTRAVENOUS at 21:36

## 2020-05-17 RX ADMIN — HYDROCODONE BITARTRATE AND ACETAMINOPHEN PRN TAB: 7.5; 325 TABLET ORAL at 12:58

## 2020-05-17 RX ADMIN — FOLIC ACID SCH MG: 1 TABLET ORAL at 09:00

## 2020-05-17 RX ADMIN — LACTULOSE SCH GM: 10 SOLUTION ORAL at 13:08

## 2020-05-17 RX ADMIN — CARVEDILOL SCH MG: 3.12 TABLET, FILM COATED ORAL at 13:09

## 2020-05-17 RX ADMIN — HYDROCODONE BITARTRATE AND ACETAMINOPHEN PRN TAB: 7.5; 325 TABLET ORAL at 17:21

## 2020-05-17 RX ADMIN — DEXTROSE AND SODIUM CHLORIDE SCH MLS/HR: 5; .9 INJECTION, SOLUTION INTRAVENOUS at 09:44

## 2020-05-17 RX ADMIN — CARVEDILOL SCH MG: 3.12 TABLET, FILM COATED ORAL at 21:00

## 2020-05-17 RX ADMIN — WHITE PETROLATUM SCH EA: 57 PASTE TOPICAL at 09:00

## 2020-05-17 RX ADMIN — HYDROCORTISONE SODIUM SUCCINATE SCH MG: 100 INJECTION, POWDER, FOR SOLUTION INTRAMUSCULAR; INTRAVENOUS at 05:00

## 2020-05-17 NOTE — NUR
DIALYSIS NURSE IS PRESENT TO COMPLETE DIALYSIS ON PATIENT. PT IS IN STABLE CONDITION WITH NO 
SIGNS OF DISTRESS NOTED. PT DOES NOT COMPLAIN OF PAIN AT THIS TIME. SAFETY MEASURES IN PLACE 
AND WILL CONTINUE TO MONITOR

## 2020-05-17 NOTE — NUR
RECEIVED REPORT FROM AM NURSE. PATIENT LYING DOWN IN BED WATCHING TV. NO DISTRESS NOTED. 
PAIN WITHIN TOLERABLE AT THIS TIME. RESPIRATIONS EVEN, UNLABORED, ON ROOM AIR. IV SITE 
INTACT, PATENT, AND INFUSING IVF AS PER MD ORDERS. HAS LEFT EAR CANCER, DRESSING IS DRY AND 
INTACT. LEFT BUTTOCK ULCER NOTED, DRESSING DRY AND INTACT. ABDOMEN SOFT, NON-DISTENDED. 
SAFETY MEASURES IN PLACE, CALL LIGHT WITHIN REACH. WILL CONTINUE TO MONITOR.

## 2020-05-17 NOTE — NUR
RECEIVED PT FROM NIGHT SHIFT NURSE. PT IS CURRENTLY AWAKE, ALERT IN BED WITH NO SIGNS OF 
DISTRESS NOTED. DRESSING WAS CHANGED AND PT SAYS THAT BOTTOM IS SUPER SENSITIVE. OPEN WOUND 
ON LEFT EAR AND BLISTER ON LEFT BUTTOCKS. IV IS PATENT ASYMPTOMATIC AND INFUSING AS PER 
ORDER. RESPIRATIONS EVEN AND UNLABORED ON ROOM AIR. SAFETY MEASURES IN PLACE AND WILL 
CONTINUE TO MONITOR.

## 2020-05-17 NOTE — NUR
TRANSFERRED PT TO TELEMETRY UNIT. PT SHOWED NO SIGNS OF DISTRESS AND VITAL SIGNS WERE WITHIN 
NORMAL RANGE. PT WAS TRANSFERRED VIA GURNEY AND WALKED TO BED. ENDORSED TO NURSE NICKERSON. PT 
IS IN STABLE CONDITION.

## 2020-05-17 NOTE — NUR
PT AWAKE SITTING UP IN BED, ENCOURAGED PT TO TURN SIDE TO SIDE, PT VERBALIZED UNDERSTANDING. 
NO ACUTE DISTRESS NOTED. DENIES PAIN. WILL CONTINUE TO OBSERVE

## 2020-05-17 NOTE — NUR
PATIENT SITTING DOWN IN BED WITH COMPLAINS OF ABD PAIN. NORCO GIVEN AND OTHER SCHEDULED 
MEDICATIONS DUE GIVEN. WILL CONTINUE TO MONITOR.

## 2020-05-17 NOTE — NUR
PT CURRENTLY STATES THAT HE IS EXPERIENCING ABD PAIN. NORCO WAS ADMINISTERED FOR PAIN. 
MORNING MEDICATIONS WERE ADMINISTERED AS WELL. PT STATES THAT HE WANTS TO GP HOME AND DOES 
NOT WANT TO BE HERE. SAFETY MEASURES IN PLACE AND WILL CONTINUE TO MONITOR. 

-------------------------------------------------------------------------------

Addendum: 05/17/20 at 1628 by Yoko Pizano RN

-------------------------------------------------------------------------------

PT DID NOT WANT TO RECEIVE FOLIC ACID AS HE SAID THAT TOO MANY PILLS UPSET HIS STOMACH AND 
HE CANNOT TOLERATED THE PAIN.

## 2020-05-17 NOTE — NUR
DIALYSIS NURSE JUST LEFT. 1 LITER OF FLUID WAS TAKEN OUT AND VITALS ARE WITHIN STABLE 
CONDITION. SAFETY MEASURES IN PLACE AND WILL NOW ADMINISTER MORNING MEDICATIONS AS PER 
ORDER.

## 2020-05-17 NOTE — NUR
PATIENT WAS CHANGED AND HAD SMALL BM. WOUND DRESSING WAS CHANGED AS WELL DUE TO BEING 
SOILED. PT STATES THAT HE WANTS TO BE IN A ROOM BY HIMSELF. SAFETY MEASURES IN PLACE AND 
WILL CONTINUE TO MONITOR.

## 2020-05-17 NOTE — NUR
RECEIVED PATIENT FROM ICU. RECEIVED REPORT FROM TELE NURSE LEWIS FOR CONTINUITY OF CARE. 
PATIENT IN STABLE CONDITION. RESPIRATIONS EVEN AND UNLABORED, ROOM AIR. IV INTACT AND 
PATENT. INFORMED PATIENT REFUSED TO SIGN CONSENT FOR SURGERY PATIENT STATED HE MAY SIGN IN 
THE MORNING.  SAFETY MEASURES IN PLACE. BED IN LOW POSITION. BED ALARM ON. CALL LIGHT AT 
BEDSIDE. WILL CONTINUE TO MONITOR.

## 2020-05-17 NOTE — NUR
PTS DAUGHTER CALLED FOR STATUS UPDATE. DAUGHTER HAS NO FURTHER QUESTIONS AND WOULD LIKE FOR 
THE PT TO BE IN FORMED THAT SHE CALLED AND HAVE THE PT CALL HER BACK ON PERSONAL CELL PHONE. 
SAFETY MEASURES IN PLACE ADN WILL CONTINUE TO MONITOR

## 2020-05-17 NOTE — NUR
LEFT IV FOREARM AND LEFT IV AC DISCONTINUED. NEW IV INSERTED INTO LEFT HAND 22GUAGE. PATIENT 
STATES THAT WANTS ALL LINES OUT AND WANTS TO GO HOME. SAFETY MEASURES IN PLACE AND WILL 
CONTINUE TO MONITOR.

## 2020-05-17 NOTE — NUR
PATIENT LYING DOWN IN BED SLEEPING, AROUSABLE BY VOICE. NO DISTRESS NOTED. WILL CONTINUE TO 
MONITOR.

## 2020-05-17 NOTE — NUR
DISCHARGE PLANNING:



THIS IS A 58 Y/O MALE FROM HOME, WHO CAME IN DUE TO ABDOMINAL PAIN AND INCREASING WEAKNESS X 
2 DAYS.  PAST MEDICAL HISTORY INCLUDE ESRD ON HD M-W-F, RENAL TRANSPLANT, HTN AND EROSIVE 
SKIN CA OF THE FACE.  INITIAL DIAGNOSIS OF SEVERE ANEMIA AND GI BLEED.  CURRENT LABS INCLUDE 
WBC 1.8,  H/H 3.8.11.5 ON ADMISSION, 2 U PRBC TRANSFUSED-CURRENT 8.7/25.1, NA/K 142/4.7, 
BUN/CREA 53/4.3, MAG 1.6, TROP 1.493 AND ALB 2.0.  INR 1.34.  ON TELE STATUS.  ID, PULMO, 
GI, SURGICAL AND NEPHRO CONSULTS IN PLACE.  DC PLAN BACK TO HOME ONCE STABLE.

-------------------------------------------------------------------------------

Addendum: 05/18/20 at 1428 by Nidia Pelaez CM

-------------------------------------------------------------------------------

DC PLANNING:

TOTAL OF 4 UNITS PRBC TRANSFUSED H/H 8.7/26.0 . SEEN BY NEPHRO SHIRLEY GOLDSMITH CONTINUE 
HEMODIALYSIS , FOLLOW UP WITH HEMO/ONC. TRANSFUSE AS NEEDED.  DR MICHAELS TO REMOVE THE TUNNELED 
DIALYSIS CATH AND PLACE A BI TOMORROW FOR A LINE HOLIDAY DUE TO GRAM NEGATIVE 
BACTEREMIA. CM TO FOLLOW. 

-------------------------------------------------------------------------------

Addendum: 05/19/20 at 1439 by Nidia Pelaez CM

-------------------------------------------------------------------------------

DISCHARGE PLANNING:

FAXED THE PAPERWORK FOR TO [facility] AWAITING FOR THE RESPONSE. 


-------------------------------------------------------------------------------

Addendum: 05/19/20 at 1451 by Nidia Pelaez CM

-------------------------------------------------------------------------------

DC PLANNING:

CALLED [facility] PT IS SCHEDULED MONDAY AND FRIDAY, CHAIR TIME IS 
2:30-6PM PER PATIENT HIS WIFE PROVIDE THE TRANSPORT. CM TO FOLLOW.

-------------------------------------------------------------------------------

Addendum: 05/19/20 at 1636 by Shandra Marie 

-------------------------------------------------------------------------------

RECEIVED A CALL FROM JEANNINE OF [facility] JOSE MARTIN, STATING THAT THEY ARE ABLE TO ACCEPT THE PATIENT 
WHEN READY FOR DC.  DR. SUAZO MADE AWARE, HE STATED HE WILL REACH OUT TO NEPHRO AND WILL LET 
US KNOW.

-------------------------------------------------------------------------------

Addendum: 05/20/20 at 1540 by Nidia Pelaez CM

-------------------------------------------------------------------------------

DC PLANNING:

PT AGREED WITH HOSPICE ,[facility] SPOKE WITH THE PATIENT AND PT'S DAUGHTER , 
BOTH AGREED ON HOSPICE,  AT 1400 PT CHANGED HIS MIND CALLED DR SUAZO AND CANCELED HOSPICE AND 
 WANTED TO GO TO [facility]. CALLED [facility] JOSE MARTIN SPOKE WITH JACK  PROVIDE THE  ROOM # 
[address]. HOWEVER PER [facility] GUIDE LINE WE CAN NOT DC PATIENT WITH A PENDING COVID- TEST RESULT. 
CHECKED WITH LAB STATED THE COVID -19 RESULT WILL BE READY TOMORROW  NOTIFIED JACK AT  [facility] TO HOLD THE BED UNTIL WE GET THE COVID RESULT. PER JACK WILL HOLD THE BED UNTIL 
TOMORROW. CM TO FOLLOW  

-------------------------------------------------------------------------------

Addendum: 05/21/20 at 1046 by Cyndy Fuentes CM

-------------------------------------------------------------------------------

SPOKE WITH JEANNINE BUTTERFIELD AT [facility] PATIENT WILL BE GOING TO ROOM 225C TODAY, HE STATED 
THAT THEY COULD ACCEPT PATIENT AROUND 4:00-5:00 PM DUE TO STATE BEING AT THE FACILITY. JEANNINE 
IS GOING TO SET UP TRANSPORTATION. 

-------------------------------------------------------------------------------

Addendum: 05/21/20 at 1209 by Cyndy Fuentes CM

-------------------------------------------------------------------------------

PATIENT WILL BE DISCHARGED TO [facility] TODAY. [facility] TRANSPORTATION ETA WILL BE 
3:00-4:00 PM PER JEANNINE AT [facility]. 

-------------------------------------------------------------------------------

Addendum: 05/21/20 at 1224 by Nidia Pelaez CM

-------------------------------------------------------------------------------

DC PLANNING:

CALLED DR YUN (NEPHROLOGIST) REGARDING  DIALYSIS TODAY, PT IS SCHEDULED OUTPATIENT DIALYSIS 
MONDAY AND FRIDAY , PER DR YUN CANCELLED THE DIALYSIS TODAY AND WILL  CONTINUE TO HAVE 
DIALYSIS TOMORROW AS OUT PATIENT. CALLED [facility] LEFT A MESSAGE FOR 
ALICIA NOTIFIED THEM PT WILL GO TO [facility] AND COME TOMORROW TO HAVE DIALYSIS AS OUT 
PATIENT.

## 2020-05-17 NOTE — NUR
NO BM NOTED, PT STATES, " IM PASSING GAS" PT STATES PAIN RELIEVED, 0/10 PAIN WILL CONTINUE 
TO OBSERVE.

## 2020-05-17 NOTE — NUR
PT STATES THAT HE DOES NOT WANT TO SIGN CONSENT AT THIS TIME. HE STATES THAT MAYBE TOMORROW 
MORNING HE WILL GIVE IT MORE THOUGHT. PT STILL COMPLAINS OF ABD PAIN. SAFETY MEASURES IN 
PLACE AND WILL CONTINUE TO MONITOR

## 2020-05-17 NOTE — NUR
GAVE PRN PAIN MEDICATION PER PATIENT REQUEST. PATIENT TOLERATED WELL. BED IN LOW POSITION. 
CALL LIGHT AT BEDSIDE. WILL CONTINUE TO MONITOR.

## 2020-05-17 NOTE — NUR
PT C/O 8/10 ABD PAIN, REPOSITIONED PT, BED PAN PLACED.  PRN MORPHINE GIVEN. WILL CONTINUE TO 
OBSERVE.

## 2020-05-18 VITALS — DIASTOLIC BLOOD PRESSURE: 77 MMHG | SYSTOLIC BLOOD PRESSURE: 118 MMHG

## 2020-05-18 VITALS — DIASTOLIC BLOOD PRESSURE: 68 MMHG | SYSTOLIC BLOOD PRESSURE: 110 MMHG

## 2020-05-18 VITALS — DIASTOLIC BLOOD PRESSURE: 70 MMHG | SYSTOLIC BLOOD PRESSURE: 108 MMHG

## 2020-05-18 VITALS — SYSTOLIC BLOOD PRESSURE: 114 MMHG | DIASTOLIC BLOOD PRESSURE: 71 MMHG

## 2020-05-18 VITALS — SYSTOLIC BLOOD PRESSURE: 109 MMHG | DIASTOLIC BLOOD PRESSURE: 74 MMHG

## 2020-05-18 VITALS — SYSTOLIC BLOOD PRESSURE: 122 MMHG | DIASTOLIC BLOOD PRESSURE: 75 MMHG

## 2020-05-18 LAB
ANION GAP SERPL CALCULATED.3IONS-SCNC: 8.6 MMOL/L (ref 8–16)
BUN SERPL-MCNC: 33 MG/DL (ref 7–18)
CHLORIDE SERPL-SCNC: 105 MMOL/L (ref 98–107)
CO2 SERPL-SCNC: 30 MMOL/L (ref 21–32)
CREAT SERPL-MCNC: 3.2 MG/DL (ref 0.6–1.3)
EOSINOPHIL NFR BLD MANUAL: 1 % (ref 0–4)
ERYTHROCYTE [DISTWIDTH] IN BLOOD BY AUTOMATED COUNT: 17.3 % (ref 11.6–13.7)
GFR SERPL CREATININE-BSD FRML MDRD: 26 ML/MIN (ref 90–?)
GLUCOSE SERPL-MCNC: 140 MG/DL (ref 74–106)
HCT VFR BLD AUTO: 26 % (ref 36–52)
HGB BLD-MCNC: 8.9 G/DL (ref 12–18)
LYMPHOCYTES NFR BLD MANUAL: 10 % (ref 20–46)
MAGNESIUM SERPL-MCNC: 2.3 MG/DL (ref 1.8–2.4)
MCH RBC QN AUTO: 33 PG (ref 27–31)
MCHC RBC AUTO-ENTMCNC: 34 G/DL (ref 33–37)
MCV RBC AUTO: 97.1 FL (ref 80–94)
MONOCYTES NFR BLD MANUAL: 5 % (ref 5–12)
PHOSPHATE SERPL-MCNC: 3.9 MG/DL (ref 2.5–4.9)
PLATELET # BLD AUTO: 97 K/UL (ref 140–450)
POTASSIUM SERPL-SCNC: 3.6 MMOL/L (ref 3.5–5.1)
RBC # BLD AUTO: 2.68 MIL/UL (ref 4.2–6.1)
SODIUM SERPL-SCNC: 140 MMOL/L (ref 136–145)
WBC # BLD AUTO: 2.2 K/UL (ref 4.8–10.8)

## 2020-05-18 RX ADMIN — SODIUM CHLORIDE SCH MLS/HR: 9 INJECTION, SOLUTION INTRAVENOUS at 21:09

## 2020-05-18 RX ADMIN — DEXTROSE AND SODIUM CHLORIDE SCH MLS/HR: 5; .9 INJECTION, SOLUTION INTRAVENOUS at 04:46

## 2020-05-18 RX ADMIN — WHITE PETROLATUM SCH EA: 57 PASTE TOPICAL at 09:47

## 2020-05-18 RX ADMIN — FAMOTIDINE SCH MG: 20 TABLET ORAL at 09:46

## 2020-05-18 RX ADMIN — KETOROLAC TROMETHAMINE PRN MG: 15 INJECTION, SOLUTION INTRAMUSCULAR; INTRAVENOUS at 21:27

## 2020-05-18 RX ADMIN — HYDROCORTISONE SODIUM SUCCINATE SCH MG: 100 INJECTION, POWDER, FOR SOLUTION INTRAMUSCULAR; INTRAVENOUS at 14:17

## 2020-05-18 RX ADMIN — FOLIC ACID SCH MG: 1 TABLET ORAL at 09:46

## 2020-05-18 RX ADMIN — HYDROCODONE BITARTRATE AND ACETAMINOPHEN PRN TAB: 7.5; 325 TABLET ORAL at 09:47

## 2020-05-18 RX ADMIN — CARVEDILOL SCH MG: 3.12 TABLET, FILM COATED ORAL at 20:35

## 2020-05-18 RX ADMIN — SENNOSIDES A AND B SCH MG: 8.6 TABLET, FILM COATED ORAL at 20:35

## 2020-05-18 RX ADMIN — HYDROCORTISONE SODIUM SUCCINATE SCH MG: 100 INJECTION, POWDER, FOR SOLUTION INTRAMUSCULAR; INTRAVENOUS at 20:34

## 2020-05-18 RX ADMIN — HYDROCORTISONE SODIUM SUCCINATE SCH MG: 100 INJECTION, POWDER, FOR SOLUTION INTRAMUSCULAR; INTRAVENOUS at 04:45

## 2020-05-18 RX ADMIN — CARVEDILOL SCH MG: 3.12 TABLET, FILM COATED ORAL at 09:00

## 2020-05-18 RX ADMIN — POLYETHYLENE GLYCOL 3350 SCH GM: 17 POWDER, FOR SOLUTION ORAL at 20:34

## 2020-05-18 RX ADMIN — KETOROLAC TROMETHAMINE PRN MG: 15 INJECTION, SOLUTION INTRAMUSCULAR; INTRAVENOUS at 14:41

## 2020-05-18 RX ADMIN — HYDROCODONE BITARTRATE AND ACETAMINOPHEN PRN TAB: 7.5; 325 TABLET ORAL at 04:46

## 2020-05-18 RX ADMIN — Medication SCH TAB: at 09:46

## 2020-05-18 RX ADMIN — LACTULOSE SCH GM: 10 SOLUTION ORAL at 09:00

## 2020-05-18 RX ADMIN — FOLIC ACID SCH MG: 1 TABLET ORAL at 20:35

## 2020-05-18 RX ADMIN — LACTULOSE SCH GM: 10 SOLUTION ORAL at 09:46

## 2020-05-18 NOTE — NUR
PT C/O PAIN 6/10 AND WAS MEDICATED WITH NORCO. PT TOLERATED WELL. NO S/S OF RESPIRATORY 
DISTRESS OR DISCOMFORT NOTED AT THIS TIME. WILL CONTINUE TO MONITOR.

## 2020-05-18 NOTE — NUR
RECEIVED REPORT FROM NIGHT SHIFT NURSE LETA-RN. PT RESTING IN BED, AOX4, Citizen of [place]/ENGLISH 
SPEAKING, ON ROOM AIR WITH LEFT HAND #22G RUNNING D5/NS0.9 @50ML/HR. LEFT BUTTOCKS PRESSURE 
ULCER AND LEFT EAR CANCER. GENERALIZED WEAKNESS, AMBULATORY WITH ASSISTANCE, USES URINAL AND 
BED PAN. HARD OF HEARING. DISCUSSED PLAN OF CARE AND PT VERBALIZED UNDERSTANDING. NO S/S OF 
RESPIRATORY DISTRESS OR DISCOMFORT NOTED AT THIS TIME. WILL CONTINUE TO MONITOR.

## 2020-05-18 NOTE — NUR
WOUND CARE DRESSING ON LEFT EAR CHANGED. PT TOLERATED WELL. NO S/S OF RESPIRATORY DISTRESS 
OR DISCOMFORT NOTED AT THIS TIME. WILL CONTINUE TO MONITOR.

## 2020-05-18 NOTE — NUR
SCHEDULED MEDICATIONS GIVEN AND TOLERATED WELL. COREG AND NORVASE NOT GIVEN DUE TO DECREASED 
/68, HR 79. NO S/S OF RESPIRATORY DISTRESS OR DISCOMFORT NOTED AT THIS TIME. WILL 
CONTINUE TO MONITOR.

## 2020-05-18 NOTE — NUR
PATIENT COMPLAINS OF ABD PAIN, NORCO GIVEN AT THIS TIME. OTHER SCHEDULED MEDICATIONS DUE 
GIVEN. WILL CONTINUE TO MONITOR.

## 2020-05-18 NOTE — NUR
SCHEDULED MEDICATION SOLU-CORTET WAS GIVEN AND TOLERATED WELL. NO S/S OF RESPIRATORY 
DISTRESS OR DISCOMFORT NOTED AT THIS TIME. WILL CONTINUE TO MONITOR.

## 2020-05-18 NOTE — NUR
PATIENT IV SITE NOTED TO BE LEAKING. DISCONTINUED IV SITE WITH MINIMAL BLEEDING AND NO 
DISCOMFORT TO PATIENT. CANNULA INTACT. NEW IV SITE TO LEFT WRIST 22G PATENT/INTACT, INFUSING 
WELL. NO DISCOMFORT NOTED. CALL LIGHT WITHIN REACH. WILL CONTINUE TO MONITOR.

## 2020-05-18 NOTE — NUR
NOTE:



Basic Screen: 

Yes

High Risk DC Screen 

No

 Name: 

ROEL CHAVEZ

[place] Tel: 

[phone]

Relationship: 

SON

Pre-Admission Living Arrangements: 

Lives with Other

Prior ADL 

 Independent

Current Home Health Name/Tel: 

N/A

Current DME/02 Name/Tel: 

N/A

Current Hospice Name/Tel: 

N/A

Current Dialysis Name/Tel: 

N/A

Healthcare Decision Maker: 

Next of Kin

Advance Directive 

No

Physician Orders for Life Sustaining Treatment Form 

No

Patient/Family Have Educational Needs 

No

Discipline: 

Case Mgt/Social Svcs

Tentative Discharge Plan/Destination: 

No Needs Identified

Will require assistance post discharge: 

No

 Referred to : 

No

Tentative Discharge Plan Summary: 

PATIENT IS A 59-YEAR-OLD MALE ADMITTED FOR SEVERE ANEMIA. PATIENT HAS PMHX 

OF ESRD ON HD, EROSIVE SKIN CANCER OF THE FACE, HTN, AND THYROID NODULE. 

PATIENT WAS ADMITTED FROM HOME WHERE HE LIVES WITH HIS SON. SW CONTACTED 

ROEL CHAVEZ [phone] TO VERIFY DEMOGRAPHICS. PER ROEL, PATIENT 

IS INDEPENDENT WITH ALL ADLS AND HAS NO HISTORY OF MENTAL HEALTH OR 

SUBSTANCE ABUSE. TENTATIVE DISCHARGE PLAN IS FOR PATIENT TO RETURN HOME. 

NO FURTHER NEEDS IDENTIFIED.

 Signature: 

KATHY WOLF

Date: 

May 18, 2020

Time: 

10:06

## 2020-05-18 NOTE — NUR
PT C/O PAIN 10/10 AND WAS MEDICATED WITH TORADOL. PT TOLERATED WELL. NO S/S OF RESPIRATORY 
DISTRESS OR DISCOMFORT NOTED AT THIS TIME. WILL CONTINUE TO MONITOR.

## 2020-05-18 NOTE — NUR
RECEIVED PATIENT IN STABLE CONDITION FROM AM SHIFT NURSE FOR CONTINUITY OF CARE. 
RESPIRATIONS EVEN, UNLABORED. SKIN WARM, DRY. IV SITE TO LEFT HAND 22G PATENT/INTACT, 
INFUSING FLUIDS WELL. LEFT UPPER BI CATHETER NOTED. NO C/O PAIN. NO S/S ACUTE DISTRESS. 
SAFETY PRECAUTIONS IN PLACE. CALL LIGHT WITHIN REACH. WILL CONTINUE TO MONITOR.

## 2020-05-18 NOTE — NUR
SPOKE WITH DAUGHTER PRINCESS AND WAS TOLD THAT THE PT IS REFUSING SURGERY WITH DR. JAMEL MICHAELS 
BECAUSE HE HAD A BAD EXPERIENCE WITH HIM WHEN HE PLACED THE BI CATHETER BACK IN 
JANUARY. DR. RAMIREZ IS AWARE

## 2020-05-18 NOTE — NUR
MADE ROUNDS. PATIENT ASLEEP AND IN STABLE CONDITION. CALL LIGHT WITHIN REACH. WILL CONTINUE 
TO MONITOR.

## 2020-05-19 VITALS — DIASTOLIC BLOOD PRESSURE: 69 MMHG | SYSTOLIC BLOOD PRESSURE: 119 MMHG

## 2020-05-19 VITALS — SYSTOLIC BLOOD PRESSURE: 118 MMHG | DIASTOLIC BLOOD PRESSURE: 70 MMHG

## 2020-05-19 VITALS — DIASTOLIC BLOOD PRESSURE: 74 MMHG | SYSTOLIC BLOOD PRESSURE: 120 MMHG

## 2020-05-19 VITALS — SYSTOLIC BLOOD PRESSURE: 117 MMHG | DIASTOLIC BLOOD PRESSURE: 77 MMHG

## 2020-05-19 VITALS — DIASTOLIC BLOOD PRESSURE: 77 MMHG | SYSTOLIC BLOOD PRESSURE: 167 MMHG

## 2020-05-19 VITALS — DIASTOLIC BLOOD PRESSURE: 81 MMHG | SYSTOLIC BLOOD PRESSURE: 141 MMHG

## 2020-05-19 LAB
ANION GAP SERPL CALCULATED.3IONS-SCNC: 11.4 MMOL/L (ref 8–16)
APPEARANCE UR: CLEAR
BASOPHILS # BLD AUTO: 0 K/UL (ref 0–0.22)
BASOPHILS NFR BLD AUTO: 1 % (ref 0–2)
BILIRUB UR QL STRIP: NEGATIVE
BUN SERPL-MCNC: 51 MG/DL (ref 7–18)
CHLORIDE SERPL-SCNC: 108 MMOL/L (ref 98–107)
CO2 SERPL-SCNC: 27.2 MMOL/L (ref 21–32)
COLOR UR: YELLOW
CREAT SERPL-MCNC: 4.3 MG/DL (ref 0.6–1.3)
EOSINOPHIL # BLD AUTO: 0 K/UL (ref 0–0.4)
EOSINOPHIL NFR BLD AUTO: 0.2 % (ref 0–4)
ERYTHROCYTE [DISTWIDTH] IN BLOOD BY AUTOMATED COUNT: 18.3 % (ref 11.6–13.7)
GFR SERPL CREATININE-BSD FRML MDRD: 18 ML/MIN (ref 90–?)
GLUCOSE SERPL-MCNC: 131 MG/DL (ref 74–106)
GLUCOSE UR STRIP-MCNC: (no result) MG/DL
HCT VFR BLD AUTO: 26.3 % (ref 36–52)
HGB BLD-MCNC: 8.9 G/DL (ref 12–18)
HGB UR QL STRIP: NEGATIVE
LEUKOCYTE ESTERASE UR QL STRIP: NEGATIVE
LYMPHOCYTES # BLD AUTO: 0.3 K/UL (ref 2–11.5)
LYMPHOCYTES NFR BLD AUTO: 8.6 % (ref 20.5–51.1)
MAGNESIUM SERPL-MCNC: 2.8 MG/DL (ref 1.8–2.4)
MCH RBC QN AUTO: 33 PG (ref 27–31)
MCHC RBC AUTO-ENTMCNC: 34 G/DL (ref 33–37)
MCV RBC AUTO: 98.3 FL (ref 80–94)
MONOCYTES # BLD AUTO: 0.1 K/UL (ref 0.8–1)
MONOCYTES NFR BLD AUTO: 3.1 % (ref 1.7–9.3)
NEUTROPHILS # BLD AUTO: 2.7 K/UL (ref 1.8–7.7)
NEUTROPHILS NFR BLD AUTO: 87.1 % (ref 42.2–75.2)
NITRITE UR QL STRIP: NEGATIVE
PH UR STRIP: 8.5 [PH] (ref 5–9)
PHOSPHATE SERPL-MCNC: 5 MG/DL (ref 2.5–4.9)
PLATELET # BLD AUTO: 83 K/UL (ref 140–450)
POTASSIUM SERPL-SCNC: 3.6 MMOL/L (ref 3.5–5.1)
RBC # BLD AUTO: 2.68 MIL/UL (ref 4.2–6.1)
RBC #/AREA URNS HPF: (no result) /HPF (ref 0–5)
SODIUM SERPL-SCNC: 143 MMOL/L (ref 136–145)
WBC # BLD AUTO: 3.1 K/UL (ref 4.8–10.8)
WBC,URINE: (no result) /HPF (ref 0–5)

## 2020-05-19 RX ADMIN — HYDROCORTISONE SODIUM SUCCINATE SCH MG: 100 INJECTION, POWDER, FOR SOLUTION INTRAMUSCULAR; INTRAVENOUS at 13:39

## 2020-05-19 RX ADMIN — CARVEDILOL SCH MG: 3.12 TABLET, FILM COATED ORAL at 09:00

## 2020-05-19 RX ADMIN — HYDROCORTISONE SODIUM SUCCINATE SCH MG: 100 INJECTION, POWDER, FOR SOLUTION INTRAMUSCULAR; INTRAVENOUS at 20:22

## 2020-05-19 RX ADMIN — FAMOTIDINE SCH MG: 20 TABLET ORAL at 08:31

## 2020-05-19 RX ADMIN — POLYETHYLENE GLYCOL 3350 SCH GM: 17 POWDER, FOR SOLUTION ORAL at 20:38

## 2020-05-19 RX ADMIN — SODIUM CHLORIDE SCH MLS/HR: 9 INJECTION, SOLUTION INTRAVENOUS at 20:24

## 2020-05-19 RX ADMIN — POLYETHYLENE GLYCOL 3350 SCH GM: 17 POWDER, FOR SOLUTION ORAL at 08:32

## 2020-05-19 RX ADMIN — LACTULOSE SCH GM: 10 SOLUTION ORAL at 08:32

## 2020-05-19 RX ADMIN — DEXTROSE AND SODIUM CHLORIDE SCH MLS/HR: 5; .9 INJECTION, SOLUTION INTRAVENOUS at 01:44

## 2020-05-19 RX ADMIN — SENNOSIDES A AND B SCH MG: 8.6 TABLET, FILM COATED ORAL at 20:23

## 2020-05-19 RX ADMIN — HYDROCORTISONE SODIUM SUCCINATE SCH MG: 100 INJECTION, POWDER, FOR SOLUTION INTRAMUSCULAR; INTRAVENOUS at 04:05

## 2020-05-19 RX ADMIN — FOLIC ACID SCH MG: 1 TABLET ORAL at 20:24

## 2020-05-19 RX ADMIN — WHITE PETROLATUM SCH EA: 57 PASTE TOPICAL at 08:58

## 2020-05-19 RX ADMIN — KETOROLAC TROMETHAMINE PRN MG: 15 INJECTION, SOLUTION INTRAMUSCULAR; INTRAVENOUS at 20:22

## 2020-05-19 RX ADMIN — DEXTROSE AND SODIUM CHLORIDE SCH MLS/HR: 5; .9 INJECTION, SOLUTION INTRAVENOUS at 21:44

## 2020-05-19 RX ADMIN — FOLIC ACID SCH MG: 1 TABLET ORAL at 08:30

## 2020-05-19 RX ADMIN — Medication SCH TAB: at 08:30

## 2020-05-19 RX ADMIN — CARVEDILOL SCH MG: 3.12 TABLET, FILM COATED ORAL at 20:23

## 2020-05-19 NOTE — NUR
PATIENT IS CURRENTLY AND TRYING TO HAVE A BM. CNA IS ASSISTING PATIENT. HARVEY, PATIENT'S 
DAUGHTER CALLED FOR UPDATE. SHE STATED THAT SHE WANTED TO BE NOTIFIED WHEN IS THE SURGERY 
GOING TO HAPPEN. [phone]. WILL CALL HER BACK ONCE SURGEON MADE ROUNDS.

## 2020-05-19 NOTE — NUR
DRESSING TO LEFT EAR CHANGED. NO DISCOMFORT NOTED. INCONTINENT CARE PROVIDED. SMALL BOWEL 
MOVEMENT NOTED. REPOSITIONED FOR COMFORT. NO C/O PAIN. NO S/S ACUTE DISTRESS. CALL LIGHT 
WITHIN REACH. WILL CONTINUE TO MONITOR.

## 2020-05-19 NOTE — NUR
RECEIVED BEDSIDE SHIFT REPORT FROM [facility] FOR CONTINUITY OF CARE. PATIENT 
RESTING. NO SIGNS OF DISTRESS NOTED. RESPIRATIONS EVEN AND UNLABORED ON RA. IV PATENT AND 
INFUSING AT 50 ML/HR. TELE MONITOR ATTACHED AND CALL LIGHT WITHIN REACH.

## 2020-05-19 NOTE — NUR
PATIENT AWAKE. INCONTINENT CARE PROVIDED. SMALL BOWEL MOVEMENT NOTED. ENCOURAGED FREQUENT 
REPOSITIONING AS TOLERATED. DUE MEDS GIVEN. CALL LIGHT WITHIN REACH. WILL CONTINUE TO 
MONITOR.

## 2020-05-19 NOTE — NUR
PATIENT IS ASLEEP. CONTINUES IN STABLE CONDITION. NO S/S ACUTE DISTRESS. CALL LIGHT WITHIN 
REACH. WILL CONTINUE TO MONITOR.

## 2020-05-19 NOTE — NUR
ENDORSED PATIENT TO THE NIGHT SHIFT NURSE FOR CONTINUITY OF CARE. PATIENT IS CURRENTLY 
SLEEPING AND IS IN STABLE CONDITION.

## 2020-05-19 NOTE — NUR
5/19/20 RD FOLLOW UP COMPLETED



PLEASE REFER TO NUTRITION ASSESSMENT UNDER CARE ACTIVITY FOR ESTIMATED NUTRITIONAL NEEDS. 



1. CONTINUE RENAL HIGH FIBER DIET AS TOLERATED

2. RECOMMEND REZA BID DAY FOR WOUND HEALING

3. RECOMMEND NEPRO BID

4. ENCOURAGE PO INTAKE >50%

5. RD TO FOLLOW-UP 2-3 DAYS, HIGH RISK 



JODEE BENNETT, RD

## 2020-05-19 NOTE — NUR
PATIENT CALLED TO COMPLAIN OF 5/10 PAIN. MEDICATED WITH PRN PAIN MEDICATION PER MD ORDER. 
ALSO ADMINISTERED SCHEDULED 2100 MEDICATIONS. PATIENT REFUSED MIRALAX. PATIENT STATES HIS 
STOMACH IS CRAMPING FROM PREVIOUS MEDICATIONS ADMINISTERED FOR CONSTIPATION. PATIENT 
TOLERATED MEDICATION ADMINISTRATION WELL. NO SIGNS OF DISTRESS NOTED. TELE MONITOR ATTACHED 
AND CALL LIGHT WITHIN REACH. WILL REASSESS PAIN

## 2020-05-19 NOTE — NUR
ROUNDING PATIENT RESTING. NO SIGNS OF DISTRESS NOTED. RESPIRATIONS EVEN AND UNLABORED ON RA. 
TELE MONITOR ATTACHED AND CALL LIGHT WITHIN REACH

## 2020-05-19 NOTE — NUR
PATIENT SLEEPING AT THIS TIME. NO S/S ACUTE DISTRESS. SAFETY PRECAUTIONS IN PLACE. CALL 
LIGHT WITHIN REACH. WILL CONTINUE TO MONITOR.

## 2020-05-19 NOTE — NUR
GIVEN MORNING MEDICATIONS PO. BISACODYL SUPP. PATIENT TOLERATED WELL. CHANGED LEFT EAR WOUND 
DRESSING. BED IN LOW POSITION. CALL LIGHT IS WITHIN REACH. WILL CONTINUE TO MONITOR.

## 2020-05-19 NOTE — NUR
RECEIVED PATIENT FROM NIGHT SHIFT NURSE FOR CONTINUITY OF CARE. PATIENT IS AAOX4. ENGLISH 
SPEAKING. NO SIGNS OF DISTRESS NOTED. RESPIRATIONS EVEN AND UNLABORED, ON ROOM AIR. VISIBLE 
CHEST AND RISE AND FALL NOTED. ON TELE MONITORING. ABDOMEN SOFT AND NONTENDER. PATIENT 
DENIES ABDOMINAL PAIN AT THIS TIME. FULL LIQUID DIET. SKIN WARM AND DRY. HEAD WOUND IV IN 
THE LEFT WRIST GAUGE 22 RUNNING D5NS AT 50 ML/HR. NO SIGNS OF INFILTRATION NOTED. IV FLUID 
RUNNING WELL. L ISAMAR CATH FOR DIALYSIS, INFECTED. DR. NG AND DR. YUN ON THE CASE 
FOR REPLACEMENT. RIGHT ARM SHUNT NOTED. THRILL AND BRUIT NOTED. PATIENT IS CONTINENT, USES 
URINAL. BEDREST. ABLE TO MAKE NEEDS KNOWN. FALL PRECAUTION. BED IN LOW POSITION. CALL LIGHT 
IS WITHIN REACH. WILL CONTINUE TO MONITOR.

## 2020-05-19 NOTE — NUR
SOAP SUDS ENEMA AS ORDERED COMPLETED . PATIENT TOLERATED FAIRLY. EXPLAINED PROCEDURE PRIOR 
TO ADMINISTRATION OF ENEMA.

## 2020-05-20 VITALS — DIASTOLIC BLOOD PRESSURE: 75 MMHG | SYSTOLIC BLOOD PRESSURE: 123 MMHG

## 2020-05-20 VITALS — DIASTOLIC BLOOD PRESSURE: 71 MMHG | SYSTOLIC BLOOD PRESSURE: 132 MMHG

## 2020-05-20 VITALS — SYSTOLIC BLOOD PRESSURE: 128 MMHG | DIASTOLIC BLOOD PRESSURE: 76 MMHG

## 2020-05-20 VITALS — DIASTOLIC BLOOD PRESSURE: 81 MMHG | SYSTOLIC BLOOD PRESSURE: 128 MMHG

## 2020-05-20 VITALS — DIASTOLIC BLOOD PRESSURE: 96 MMHG | SYSTOLIC BLOOD PRESSURE: 110 MMHG

## 2020-05-20 RX ADMIN — HYDROCORTISONE SODIUM SUCCINATE SCH MG: 100 INJECTION, POWDER, FOR SOLUTION INTRAMUSCULAR; INTRAVENOUS at 20:59

## 2020-05-20 RX ADMIN — POLYETHYLENE GLYCOL 3350 SCH GM: 17 POWDER, FOR SOLUTION ORAL at 09:00

## 2020-05-20 RX ADMIN — CARVEDILOL SCH MG: 3.12 TABLET, FILM COATED ORAL at 21:00

## 2020-05-20 RX ADMIN — SENNOSIDES A AND B SCH MG: 8.6 TABLET, FILM COATED ORAL at 21:00

## 2020-05-20 RX ADMIN — Medication SCH TAB: at 09:15

## 2020-05-20 RX ADMIN — POLYETHYLENE GLYCOL 3350 SCH GM: 17 POWDER, FOR SOLUTION ORAL at 21:00

## 2020-05-20 RX ADMIN — KETOROLAC TROMETHAMINE PRN MG: 15 INJECTION, SOLUTION INTRAMUSCULAR; INTRAVENOUS at 04:53

## 2020-05-20 RX ADMIN — FAMOTIDINE SCH MG: 20 TABLET ORAL at 09:15

## 2020-05-20 RX ADMIN — KETOROLAC TROMETHAMINE PRN MG: 15 INJECTION, SOLUTION INTRAMUSCULAR; INTRAVENOUS at 17:07

## 2020-05-20 RX ADMIN — SODIUM CHLORIDE SCH MLS/HR: 9 INJECTION, SOLUTION INTRAVENOUS at 20:58

## 2020-05-20 RX ADMIN — FOLIC ACID SCH MG: 1 TABLET ORAL at 09:15

## 2020-05-20 RX ADMIN — DEXTROSE AND SODIUM CHLORIDE SCH MLS/HR: 5; .9 INJECTION, SOLUTION INTRAVENOUS at 17:44

## 2020-05-20 RX ADMIN — HYDROCORTISONE SODIUM SUCCINATE SCH MG: 100 INJECTION, POWDER, FOR SOLUTION INTRAMUSCULAR; INTRAVENOUS at 13:00

## 2020-05-20 RX ADMIN — CARVEDILOL SCH MG: 3.12 TABLET, FILM COATED ORAL at 09:00

## 2020-05-20 RX ADMIN — LACTULOSE SCH GM: 10 SOLUTION ORAL at 09:00

## 2020-05-20 RX ADMIN — FOLIC ACID SCH MG: 1 TABLET ORAL at 20:58

## 2020-05-20 RX ADMIN — HYDROCORTISONE SODIUM SUCCINATE SCH MG: 100 INJECTION, POWDER, FOR SOLUTION INTRAMUSCULAR; INTRAVENOUS at 04:53

## 2020-05-20 RX ADMIN — WHITE PETROLATUM SCH EA: 57 PASTE TOPICAL at 09:15

## 2020-05-20 NOTE — NUR
CALL [facility] AND TALKED TO [facility] TO SET UP TRANSFER AND GIVE REPORT. THEY SAID THEY NOT 
READY TO ACCEPT PATIENT TODAY BUT IT WILL BE READY BY TOMORROW MORNING. FOR ANYTHING TO ASK, 
CALL [facility] #[phone]. WILL ENDORSE TO DAY SHIFT NURSE.

## 2020-05-20 NOTE — NUR
RECEIVED PATIENT FROM NIGHT SHIFT NURSE FOR CONTINUITY OF CARE. PATIENT IS AAOX4. ENGLISH 
SPEAKING. NO SIGNS OF DISTRESS NOTED. RESPIRATIONS EVEN AND UNLABORED, ON ROOM AIR. VISIBLE 
CHEST AND RISE AND FALL NOTED. ON TELE MONITORING. ABDOMEN SOFT AND NONTENDER. RENAL DIET. 
SKIN WARM AND DRY. LEFT EAR OPEN WOUND WITH DRESSING.  IV IN THE LEFT WRIST GAUGE 22 RUNNING 
D5NS AT 50 ML/HR. NO SIGNS OF INFILTRATION NOTED. IV FLUID RUNNING WELL. L ISAMAR CATH FOR 
DIALYSIS, INFECTED AND REPLACEMENT NEED. RIGHT ARM SHUNT NOTED. THRILL AND BRUIT NOTED. 
PATIENT IS CONTINENT, USES URINAL. BEDREST. ABLE TO MAKE NEEDS KNOWN. FALL PRECAUTION. BED 
IN LOW POSITION. CALL LIGHT IS WITHIN REACH. WILL CONTINUE TO MONITOR.

## 2020-05-20 NOTE — NUR
PROVIDED PATIENT 0500 MEDICATION. PATIENT COMPLAINED OF MODERATE PAIN 5/10 ON PAIN SCALE. 
MEDICATED PATIENT WITH PRN MEDICATION PER MD ORDER. PATIENT TOLERATED WELL NO SIGNS OF 
DISTRESS NOTED

## 2020-05-20 NOTE — NUR
PER  PATIENT REFUSED AM LABS. PATIENT EDUCATED ON IMPORTANCE OF AM LABS DRAW 
PATIENT STILL REFUSED. PATIENT IN STABLE CONDITION RESPIRATIONS EVEN AND UNLABORED ON RA. 
TELE MONITOR ATTACHED AND CALL LIGHT WITHIN REACH. WILL CONTINUE TO MONITOR AND EXPLAIN 
REFUSAL TO AM SHIFT

## 2020-05-20 NOTE — NUR
GIVEN TORADOL IVP FOR ABD PAIN 9/10 THAT IS ACHING AND SEVERE. EXPLAINED MEDICATION. WILL 
REASSESS PAIN

## 2020-05-20 NOTE — NUR
PATIENT VERBALIZED THAT HE WANTED TO GO HOME AND SIGN AMA PAPERWORK. CALLED HARVEY, PATIENT'S 
DAUGHTER. SHE STATED THAT SHE WANTED TO TALK TO DR. SUAZO.

## 2020-05-20 NOTE — NUR
RECEIVED PATIENT FROM DAY SHIFT NURSE VICKEY FOR CONTINUITY OF CARE. PATIENT IS AAOX4. 
ENGLISH SPEAKING. NO SIGNS OF DISTRESS NOTED. RESPIRATIONS EVEN AND UNLABORED, ON ROOM AIR. 
ON TELE MONITORING. ABDOMEN SOFT AND NONTENDER. RENAL DIET. SKIN WARM AND DRY. LEFT EAR OPEN 
WOUND WITH DRESSING.  IV SITE ON L WRIST 22G, PATENT, INTACT, AND ASYMPTOMATIC. L BI 
CATH FOR DIALYSIS, INFECTED AND REPLACEMENT NEED. RIGHT ARM SHUNT NOTED. THRILL AND BRUIT 
NOTED. PATIENT IS CONTINENT, USES URINAL. DISCHARGE ORDER NOTED. FALL PRECAUTION. BED IN LOW 
POSITION. CALL LIGHT IS WITHIN REACH.

## 2020-05-20 NOTE — NUR
PATIENT CALLED REQUESTED TO HAVE AN ADDITIONAL BLANKET. PROVIDED BLANKET. NO SIGNS OF 
DISTRESS NOTED. TELE MONITOR ATTACHED AND CALL LIGHT WITHIN REACH

## 2020-05-20 NOTE — NUR
ENDORSED PATIENT TO DAYSHIFT NURSE AT BEDSIDE FOR CONTINUITY OF CARE. PATIENT AWAKE AND 
ALERT IN STABLE CONDITION.

## 2020-05-20 NOTE — NUR
CALLED HARVEY, PATIENT'S DAUGHTER. SHE STATED THAT SHE IS SETTING UP HER DAD'S HOSPICE BEFORE 
BRINGING HIS DAD BACK HOME.

## 2020-05-20 NOTE — NUR
PATIENT STATED HE DOES NOT WANT TO BE IN THE HOSPITAL ANYMORE. HE WANTS TO LEAVE AND NOT 
TAKE ANY MORE MEDICATIONS. WILL INFORM DR. SUAZO

## 2020-05-20 NOTE — NUR
GIVEN MERREM, FOLATE, AND SOLU-CORTEF AS MD ORDERED. PT REFUSED COREG, MIRALAX, AND SENNA. 
RN EXPLAINED BENEFIT AND RISK 3TIMES, PT STILL REFUSED. PT STATED PT HAD 2 BM TODAY.

## 2020-05-20 NOTE — NUR
GIVEN MORNING MEDICATIONS PO. REFUSED BP MEDS, MIRALAX, LACTULOSE. CLEANED AND APPLIED 
ZGUARD AND ABD PAD TO LEFT EAR WOUND. normal PMI/regular rate and rhythm/no murmur/no rub

## 2020-05-21 VITALS — SYSTOLIC BLOOD PRESSURE: 138 MMHG | DIASTOLIC BLOOD PRESSURE: 46 MMHG

## 2020-05-21 VITALS — SYSTOLIC BLOOD PRESSURE: 128 MMHG | DIASTOLIC BLOOD PRESSURE: 77 MMHG

## 2020-05-21 VITALS — DIASTOLIC BLOOD PRESSURE: 63 MMHG | SYSTOLIC BLOOD PRESSURE: 119 MMHG

## 2020-05-21 RX ADMIN — DEXTROSE AND SODIUM CHLORIDE SCH MLS/HR: 5; .9 INJECTION, SOLUTION INTRAVENOUS at 13:44

## 2020-05-21 RX ADMIN — Medication SCH TAB: at 09:00

## 2020-05-21 RX ADMIN — CARVEDILOL SCH MG: 3.12 TABLET, FILM COATED ORAL at 10:05

## 2020-05-21 RX ADMIN — FOLIC ACID SCH MG: 1 TABLET ORAL at 09:00

## 2020-05-21 RX ADMIN — KETOROLAC TROMETHAMINE PRN MG: 15 INJECTION, SOLUTION INTRAMUSCULAR; INTRAVENOUS at 10:03

## 2020-05-21 RX ADMIN — CARVEDILOL SCH MG: 3.12 TABLET, FILM COATED ORAL at 09:00

## 2020-05-21 RX ADMIN — LACTULOSE SCH GM: 10 SOLUTION ORAL at 10:03

## 2020-05-21 RX ADMIN — POLYETHYLENE GLYCOL 3350 SCH GM: 17 POWDER, FOR SOLUTION ORAL at 09:00

## 2020-05-21 RX ADMIN — HYDROCORTISONE SODIUM SUCCINATE SCH MG: 100 INJECTION, POWDER, FOR SOLUTION INTRAMUSCULAR; INTRAVENOUS at 13:00

## 2020-05-21 RX ADMIN — HYDROCORTISONE SODIUM SUCCINATE SCH MG: 100 INJECTION, POWDER, FOR SOLUTION INTRAMUSCULAR; INTRAVENOUS at 04:06

## 2020-05-21 RX ADMIN — FAMOTIDINE SCH MG: 20 TABLET ORAL at 09:00

## 2020-05-21 RX ADMIN — WHITE PETROLATUM SCH EA: 57 PASTE TOPICAL at 09:00

## 2020-05-21 NOTE — NUR
RECEIVED REPORT FROM NIGHT SHIFT RN FOR CONTINUITY OF CARE. PT IS AAOX4, COOPERATIVE AND 
ABLE TO MAKE NEEDS KNOWN. PT ON RA. SKIN NON-INTACT DUE TO OPEN WOUND ON LEFT BUTTOCK AND 
LEFT SIDE OF HIS HEAD. PT HAS LEFT WRIST 22G INFUSING D5NS @ 50ML/HR. IV INTACT AND PATENT. 
PT HAS R ARM AV SHUNT FOR HD ACCESS. PER NIGHT SHIFT RN, PT ALSO HAS LEFT CHEST BI CATH 
THAT IS INFECTED BUT PT REFUSING TO GET IT REMOVED. PT BEDBOUND. DISCUSSED POC WITH PT AND 
PT VERBALIZED UNDERSTANDING. SAFETY MEASURES IN PLACE. CALL LIGHT WITHIN REACH, BED IN LOW 
POSITION. WILL ROUND FREQUENTLY ON PT THROUGHOUT THE SHIFT.

## 2020-05-21 NOTE — NUR
PT DISCHARGED TO [facility] FOR CONTINUATION OF CARE. PT TO HAVE PHYSICAL THERAPY AND 
CONTINUE ABX THERAPY. PT WOUND DRESSING CHANGED. ALL PERSONAL BELONGINGS TAKEN WITH PT. PT 
GIVEN ALL DISCHARGE PAPERWORK AND VERBALIZED UNDERSTANDING OF DC TEACHING. CALLED [facility] AND GAVE REPORT TO RECEIVING RN. RN CONFIRMED PT IS GOING TO  #225C. PT LEFT IN 
STABLE CONDITION ACCOMPANIED BY TRANSPORT TEAM.

## 2020-05-21 NOTE — NUR
GIVEN SOLU-CORTEF AS MD ORDERED. PT TOLERATED WELL. BS CHECKED, WITHIN PT'S BASELINE. WILL 
CONTINUE TO MONITOR.

## 2020-05-21 NOTE — NUR
ADMIN MORNING MEDS TO PT. PT TOLERATED WELL. ALL NEEDS MET. WILL CONTINUE TO ROUND 
FREQUENTLY ON PT.

## 2023-07-06 NOTE — NUR
SOLU-CORTEF GIVEN IVP PER MD ORDER. EXPLAINED MEDICATION. BED IN LOW POSITION. CALL LIGHT IS 
WITHIN REACH. PATIENT IS CURRENTLY RIGHT SIDE LYING AFTER SOAP SUDS [employer] Normal vision: sees adequately in most situations; can see medication labels, newsprint